# Patient Record
Sex: MALE | Race: WHITE | NOT HISPANIC OR LATINO | Employment: UNEMPLOYED | URBAN - METROPOLITAN AREA
[De-identification: names, ages, dates, MRNs, and addresses within clinical notes are randomized per-mention and may not be internally consistent; named-entity substitution may affect disease eponyms.]

---

## 2017-03-06 ENCOUNTER — HOSPITAL ENCOUNTER (OUTPATIENT)
Dept: LAB | Facility: MEDICAL CENTER | Age: 57
End: 2017-03-06
Attending: FAMILY MEDICINE
Payer: MEDICAID

## 2017-03-06 ENCOUNTER — OFFICE VISIT (OUTPATIENT)
Dept: MEDICAL GROUP | Facility: MEDICAL CENTER | Age: 57
End: 2017-03-06
Attending: FAMILY MEDICINE
Payer: MEDICAID

## 2017-03-06 ENCOUNTER — TELEPHONE (OUTPATIENT)
Dept: MEDICAL GROUP | Facility: MEDICAL CENTER | Age: 57
End: 2017-03-06

## 2017-03-06 ENCOUNTER — HOSPITAL ENCOUNTER (OUTPATIENT)
Dept: RADIOLOGY | Facility: MEDICAL CENTER | Age: 57
End: 2017-03-06
Attending: FAMILY MEDICINE
Payer: MEDICAID

## 2017-03-06 VITALS
SYSTOLIC BLOOD PRESSURE: 120 MMHG | HEART RATE: 95 BPM | OXYGEN SATURATION: 100 % | TEMPERATURE: 98.4 F | WEIGHT: 195 LBS | DIASTOLIC BLOOD PRESSURE: 82 MMHG | HEIGHT: 72 IN | BODY MASS INDEX: 26.41 KG/M2 | RESPIRATION RATE: 16 BRPM

## 2017-03-06 DIAGNOSIS — E20.0 IDIOPATHIC HYPOPARATHYROIDISM (HCC): ICD-10-CM

## 2017-03-06 DIAGNOSIS — F10.10 ETOH ABUSE: ICD-10-CM

## 2017-03-06 DIAGNOSIS — M79.641 HAND PAIN, RIGHT: ICD-10-CM

## 2017-03-06 LAB
ALBUMIN SERPL BCP-MCNC: 4.4 G/DL (ref 3.2–4.9)
ALBUMIN/GLOB SERPL: 1.6 G/DL
ALP SERPL-CCNC: 58 U/L (ref 30–99)
ALT SERPL-CCNC: 33 U/L (ref 2–50)
ANION GAP SERPL CALC-SCNC: 8 MMOL/L (ref 0–11.9)
AST SERPL-CCNC: 24 U/L (ref 12–45)
BILIRUB SERPL-MCNC: 0.8 MG/DL (ref 0.1–1.5)
BUN SERPL-MCNC: 27 MG/DL (ref 8–22)
CA-I SERPL-SCNC: 1 MMOL/L (ref 1.1–1.3)
CALCIUM SERPL-MCNC: 8 MG/DL (ref 8.5–10.5)
CHLORIDE SERPL-SCNC: 101 MMOL/L (ref 96–112)
CO2 SERPL-SCNC: 30 MMOL/L (ref 20–33)
CREAT SERPL-MCNC: 1.16 MG/DL (ref 0.5–1.4)
GLOBULIN SER CALC-MCNC: 2.7 G/DL (ref 1.9–3.5)
GLUCOSE SERPL-MCNC: 78 MG/DL (ref 65–99)
MAGNESIUM SERPL-MCNC: 1.9 MG/DL (ref 1.5–2.5)
PHOSPHATE SERPL-MCNC: 5.2 MG/DL (ref 2.5–4.5)
POTASSIUM SERPL-SCNC: 4 MMOL/L (ref 3.6–5.5)
PROT SERPL-MCNC: 7.1 G/DL (ref 6–8.2)
SODIUM SERPL-SCNC: 139 MMOL/L (ref 135–145)

## 2017-03-06 PROCEDURE — 73130 X-RAY EXAM OF HAND: CPT | Mod: RT

## 2017-03-06 PROCEDURE — 36415 COLL VENOUS BLD VENIPUNCTURE: CPT

## 2017-03-06 PROCEDURE — 80053 COMPREHEN METABOLIC PANEL: CPT

## 2017-03-06 PROCEDURE — 99212 OFFICE O/P EST SF 10 MIN: CPT | Performed by: FAMILY MEDICINE

## 2017-03-06 PROCEDURE — 84100 ASSAY OF PHOSPHORUS: CPT

## 2017-03-06 PROCEDURE — 83735 ASSAY OF MAGNESIUM: CPT

## 2017-03-06 PROCEDURE — 99214 OFFICE O/P EST MOD 30 MIN: CPT | Performed by: FAMILY MEDICINE

## 2017-03-06 PROCEDURE — 82330 ASSAY OF CALCIUM: CPT

## 2017-03-06 RX ORDER — MELOXICAM 15 MG/1
15 TABLET ORAL DAILY
Qty: 15 TAB | Refills: 0 | Status: SHIPPED | OUTPATIENT
Start: 2017-03-06 | End: 2017-11-29

## 2017-03-06 NOTE — MR AVS SNAPSHOT
"        Riki Tan   3/6/2017 8:50 AM   Office Visit   MRN: [id number]    Department:  [facility]   Dept Phone:  [phone]    Description:  Male : 1960   Provider:  Zev Calderón M.D.           Reason for Visit     Hand Injury possible broken knuckle on rt hand      Allergies as of 3/6/2017     Allergen Noted Reactions    Nkda [No Known Drug Allergy] 2009         You were diagnosed with     Hand pain, right   [433891]       Idiopathic hypoparathyroidism (CMS-HCC)   [966114]       ETOH abuse   [531996]         Vital Signs     Blood Pressure Pulse Temperature Respirations Height Weight    120/82 mmHg 95 36.9 °C (98.4 °F) 16 1.829 m (6' 0.01\") 88.451 kg (195 lb)    Body Mass Index Oxygen Saturation Smoking Status             26.44 kg/m2 100% Never Smoker          Basic Information     Date Of Birth Sex Race Ethnicity Preferred Language    1960 Male White Non- English      Your appointments     Mar 06, 2017  9:45 AM   XRAY 15 with [address] DX 1   [facility] - [address]    [address] [place] [id number]   [phone]            Mar 06, 2017 10:00 AM   Adult Draw/Collection with [facility]   [facility] (--)    [address]  [place] [address]   [phone]              Problem List              ICD-10-CM Priority Class Noted - Resolved    BPH (benign prostatic hyperplasia) N40.0   2009 - Present    ETOH abuse F10.10   2009 - Present    Hypocalcemia E83.51   2011 - Present    Hypoparathyroidism (CMS-HCC) E20.9   2011 - Present    Dysuria R30.0   2011 - Present    Vitamin D deficiency E55.9   2011 - Present    Interstitial cystitis N30.10   2011 - Present    Tobacco abuse Z72.0   2012 - Present    Hyperlipidemia E78.5   2013 - Present    Chronic renal failure, stage 3 (moderate) N18.3   2016 - Present    Hand pain, right M79.641   3/6/2017 - Present      Health Maintenance        Date " Due Completion Dates    IMM DTaP/Tdap/Td Vaccine (1 - Tdap) 3/20/1980 ---    IMM PNEUMOCOCCAL 19-64 (ADULT) MEDIUM RISK SERIES (1 of 1 - PPSV23) 3/20/1980 ---    COLONOSCOPY 7/7/2024 7/7/2014            Current Immunizations     Influenza TIV (IM) 10/18/2012, 11/15/2011    Influenza Vaccine Pediatric 1/5/2010    Influenza Vaccine Quad Inj (Pf) 12/20/2016, 11/5/2015    Influenza Vaccine Quad Inj (Preserved) 11/13/2014      Below and/or attached are the medications your provider expects you to take. Review all of your home medications and newly ordered medications with your provider and/or pharmacist. Follow medication instructions as directed by your provider and/or pharmacist. Please keep your medication list with you and share with your provider. Update the information when medications are discontinued, doses are changed, or new medications (including over-the-counter products) are added; and carry medication information at all times in the event of emergency situations     Allergies:  NKDA - (reactions not documented)               Medications  Valid as of: March 06, 2017 -  9:40 AM    Generic Name Brand Name Tablet Size Instructions for use    Calcitriol (Cap) ROCALTROL 0.25 MCG TAKE 1 CAPSULE ORALLY 3 TIMES DAILY PER DR. REID        Calcium Carbonate-Vitamin D (Tab) OSCAL + D 500-200 MG-UNIT Take 2 Tabs by mouth every morning with breakfast.        Ergocalciferol (Cap) DRISDOL 66245 UNITS Take 1 Cap by mouth every 7 days.        Hydrocodone-Acetaminophen (Tab) NORCO  MG Take 1 Tab by mouth every 8 hours as needed.        Meloxicam (Tab) MOBIC 15 MG Take 1 Tab by mouth every day.        Pentosan Polysulfate Sodium (Cap) ELMIRON 100 MG Take 2 Caps by mouth 2 times a day.        .                 Medicines prescribed today were sent to:     [id number] [place] - [address]    [address] [place] [address]    Phone: [phone] Fax: [phone]    Open 24 Hours?: No    [facility] - [place], [place] - [address]    [address] [place] [address]    Phone: [phone] Fax: [phone]    Open 24 Hours?: No      Medication refill instructions:       If your prescription bottle indicates you have medication refills left, it is not necessary to call your provider’s office. Please contact your pharmacy and they will refill your medication.    If your prescription bottle indicates you do not have any refills left, you may request refills at any time through one of the following ways: The online [employer] system (except Urgent Care), by calling your provider’s office, or by asking your pharmacy to contact your provider’s office with a refill request. Medication refills are processed only during regular business hours and may not be available until the next business day. Your provider may request additional information or to have a follow-up visit with you prior to refilling your medication.   *Please Note: Medication refills are assigned a new Rx number when refilled electronically. Your pharmacy may indicate that no refills were authorized even though a new prescription for the same medication is available at the pharmacy. Please request the medicine by name with the pharmacy before contacting your provider for a refill.        Your To Do List     Future Labs/Procedures Complete By Expires    DX-HAND 3+ RIGHT  As directed 9/6/2017      Other Notes About Your Plan     Ionized calcium every 3-4 months           [facility] Status: Patient Declined

## 2017-03-06 NOTE — PROGRESS NOTES
Chief Complaint   Patient presents with   • Hand Injury     possible broken knuckle on rt hand       HISTORY OF PRESENT ILLNESS: Patient is a 56 y.o. male established patient who presents today to follow-up on persistent pain in his right hand ×1 year, recurrent EtOH abuse, and hypoparathyroidism        Hand pain, right  Patient ports onset one year ago of pain between the MCP and PIP joint of his right long finger after a week of fairly intense use of a Sawzall. Patient reports from time to time if he either clenches his right fist tightly or has direct contact near the long finger MCP but he will have a sudden sharp pain. There's been no persistent discoloration slight thickening over the MCP has been noted. Denies diffuse hand weakness or numbness.    ETOH abuse  Patient bg is still drinking at least 1 quart of beer per day and several times per week will have double or triple that amount. He denies any tremors, history of jaundice, seizures, or abdominal pain.    Hypoparathyroidism  Patient agrees to get his labs ordered in December drawn today as soon as he leaves our office over at [facility]. He is not reporting any polyuria, hematuria. He does note intermittent dysuria associated with this history of interstitial cystitis and is taking Elmiron for that condition. Denies any unexplained muscle cramps or diarrhea.      Patient Active Problem List    Diagnosis Date Noted   • Hand pain, right 03/06/2017   • Chronic renal failure, stage 3 (moderate) 12/20/2016   • Hyperlipidemia 01/09/2013   • Tobacco abuse 05/08/2012   • Interstitial cystitis 08/31/2011   • Hypocalcemia 02/25/2011   • Hypoparathyroidism (CMS-[facility]) 02/25/2011   • Dysuria 02/25/2011   • Vitamin D deficiency 02/25/2011   • BPH (benign prostatic hyperplasia) 06/11/2009   • ETOH abuse 06/11/2009     Social history-single, not working  Allergies:Nkda    Current Outpatient Prescriptions   Medication Sig Dispense Refill   • meloxicam (MOBIC) 15 MG tablet  "Take 1 Tab by mouth every day. 15 Tab 0   • vitamin D, Ergocalciferol, (DRISDOL) 11798 UNITS Cap capsule Take 1 Cap by mouth every 7 days. 4 Cap 11   • calcitRIOL (ROCALTROL) 0.25 MCG Cap TAKE 1 CAPSULE ORALLY 3 TIMES DAILY PER DR. REID 90 Cap 6   • hydrocodone/acetaminophen (NORCO)  MG Tab Take 1 Tab by mouth every 8 hours as needed. 25 Tab 0   • pentosan (ELMIRON) 100 MG Cap Take 2 Caps by mouth 2 times a day. 120 Cap 11   • calcium carbonate-vitamin D (CALCIUM 500/D) 500-200 MG-UNIT TABS Take 2 Tabs by mouth every morning with breakfast.       No current facility-administered medications for this visit.       Social History   Substance Use Topics   • Smoking status: Never Smoker    • Smokeless tobacco: Current User     Types: Chew      Comment: 1 can/week   • Alcohol Use: 15.0 oz/week     30 Cans of beer per week       Family History   Problem Relation Age of Onset   • Cancer Mother    • Lung Disease Neg Hx    • Diabetes Neg Hx    • Heart Disease Neg Hx    • Stroke Neg Hx        ROS:  Review of Systems   Constitutional: Negative for fever, chills, weight loss and malaise/fatigue.   Eyes: Negative for blurred vision.   Respiratory: Negative for cough, sputum production, shortness of breath and wheezing.    Cardiovascular: Negative for chest pain, palpitations, orthopnea and leg swelling.   Gastrointestinal: Negative for heartburn, nausea, vomiting and abdominal pain.   Musculoskeletal: Negative for myalgias, back pain   Endo/Heme/Allergies: Does not bruise/bleed easily.               Exam:  Blood pressure 120/82, pulse 95, temperature 36.9 °C (98.4 °F), resp. rate 16, height 1.829 m (6' 0.01\"), weight 88.451 kg (195 lb), SpO2 100 %.  General:  Well nourished, well developed male in NAD  Head is grossly normal.  Neck: Supple without JVD or bruit. Thyroid is not enlarged. Trachea is midline.  Pulmonary: Clear to ausculation .  Normal effort. No rales, ronchi, or wheezing.  Cardiovascular: Regular rate and " rhythm without murmur.  Abdomen-Abdomen is soft, normal bowel sounds, no masses, guarding, ororganomegaly, or tenderness.  Right hand-slight thickening noted over the dorsum of the long finger MCP. Slightly sensitive to compression over the midportion of the proximal phalanx with no step-off or visible deformity. There is no rotational deformity upon flexion of the fingers on the right hand. Light touch is preserved.      Please note that this dictation was created using voice recognition software. I have made every reasonable attempt to correct obvious errors, but I expect that there are errors of grammar and possibly content that I did not discover before finalizing the note.    Assessment/Plan:  1. Hand pain, right  DX-HAND 3+ RIGHT   2. Idiopathic hypoparathyroidism (CMS-HCC)     3. ETOH abuse       Plan: 1. Plain x-ray of the right hand  2. Patient agrees to wear a splint isolating the right long finger during the days ×2 weeks  3. Meloxicam 15 mg by mouth daily with food ×14 days  4. Patient will collect hypoparathyroidism labs including 24-hour urine for calcium excretion  5. Revisit within 3 months

## 2017-03-06 NOTE — ASSESSMENT & PLAN NOTE
Patient ports onset one year ago of pain between the MCP and PIP joint of his right long finger after a week of fairly intense use of a Sawzall. Patient reports from time to time if he either clenches his right fist tightly or has direct contact near the long finger MCP but he will have a sudden sharp pain. There's been no persistent discoloration slight thickening over the MCP has been noted. Denies diffuse hand weakness or numbness.

## 2017-03-06 NOTE — ASSESSMENT & PLAN NOTE
Patient agrees to get his labs ordered in December drawn today as soon as he leaves our office over at [facility]. He is not reporting any polyuria, hematuria. He does note intermittent dysuria associated with this history of interstitial cystitis and is taking Elmiron for that condition. Denies any unexplained muscle cramps or diarrhea.

## 2017-03-07 ENCOUNTER — TELEPHONE (OUTPATIENT)
Dept: MEDICAL GROUP | Facility: MEDICAL CENTER | Age: 57
End: 2017-03-07

## 2017-03-07 DIAGNOSIS — E20.0 IDIOPATHIC HYPOPARATHYROIDISM (HCC): ICD-10-CM

## 2017-03-07 NOTE — TELEPHONE ENCOUNTER
----- Message from Zev Calderón M.D. sent at 3/6/2017  5:31 PM PST -----  Hand x-ray shows intact bony architecture. Proceed with splinting and anti-inflammatory pill.

## 2017-03-07 NOTE — TELEPHONE ENCOUNTER
Phone Number Called: [phone] (home)       Message:Pt informed of the following results: Hand x-ray shows intact bony architecture. Proceed with splinting and anti-inflammatory pill.     Left Message for patient to call back: N\A

## 2017-03-08 NOTE — TELEPHONE ENCOUNTER
Phone Number Called: [phone] (home)       Message:Pt informed: Current ionized calcium is fairly good, at 1.0, normal Mg++, mildly elev phosphorus, part of his condition. I want to retest labs in 3 mos, orders done.    Left Message for patient to call back: N\A

## 2017-03-08 NOTE — TELEPHONE ENCOUNTER
----- Message from Zev Calderón M.D. sent at 3/7/2017  8:47 AM PST -----  Current ionized calcium is fairly good, at 1.0, normal Mg++, mildly elev phosphorus, part of his condition. I want to retest labs in 3 mos, orders done.

## 2017-03-10 ENCOUNTER — HOSPITAL ENCOUNTER (OUTPATIENT)
Facility: MEDICAL CENTER | Age: 57
End: 2017-03-10
Attending: FAMILY MEDICINE
Payer: MEDICAID

## 2017-03-10 ENCOUNTER — TELEPHONE (OUTPATIENT)
Dept: MEDICAL GROUP | Facility: MEDICAL CENTER | Age: 57
End: 2017-03-10

## 2017-03-10 DIAGNOSIS — E20.0 IDIOPATHIC HYPOPARATHYROIDISM (HCC): ICD-10-CM

## 2017-03-10 PROCEDURE — 82340 ASSAY OF CALCIUM IN URINE: CPT

## 2017-03-12 LAB
CALCIUM 24H UR-MCNC: 11.3 MG/DL
CALCIUM 24H UR-MRATE: 212 MG/D
CALCIUM/CREAT 24H UR: 145 MG/G (ref 20–240)
CREAT 24H UR-MCNC: 78 MG/DL
CREAT 24H UR-MRATE: 1462 MG/D (ref 800–2100)
SPECIMEN VOL ?TM UR: 1875 ML

## 2017-03-13 ENCOUNTER — TELEPHONE (OUTPATIENT)
Dept: MEDICAL GROUP | Facility: MEDICAL CENTER | Age: 57
End: 2017-03-13

## 2017-03-14 NOTE — TELEPHONE ENCOUNTER
Phone Number Called: [phone] (home)       Message: Pt informed of the following results: Urinary calcium content and excretion appears normal     Left Message for patient to call back: N\A

## 2017-06-13 ENCOUNTER — OFFICE VISIT (OUTPATIENT)
Dept: MEDICAL GROUP | Facility: MEDICAL CENTER | Age: 57
End: 2017-06-13
Attending: FAMILY MEDICINE
Payer: MEDICAID

## 2017-06-13 VITALS
DIASTOLIC BLOOD PRESSURE: 64 MMHG | HEART RATE: 100 BPM | TEMPERATURE: 97.9 F | OXYGEN SATURATION: 94 % | BODY MASS INDEX: 25.73 KG/M2 | RESPIRATION RATE: 16 BRPM | WEIGHT: 190 LBS | HEIGHT: 72 IN | SYSTOLIC BLOOD PRESSURE: 110 MMHG

## 2017-06-13 DIAGNOSIS — M79.642 LEFT HAND PAIN: ICD-10-CM

## 2017-06-13 DIAGNOSIS — E20.9 HYPOPARATHYROIDISM, UNSPECIFIED HYPOPARATHYROIDISM TYPE (HCC): ICD-10-CM

## 2017-06-13 DIAGNOSIS — N30.10 IC (INTERSTITIAL CYSTITIS): ICD-10-CM

## 2017-06-13 PROCEDURE — 99213 OFFICE O/P EST LOW 20 MIN: CPT | Performed by: FAMILY MEDICINE

## 2017-06-13 RX ORDER — IBUPROFEN 800 MG/1
800 TABLET ORAL
Qty: 60 TAB | Refills: 0 | Status: SHIPPED | OUTPATIENT
Start: 2017-06-13 | End: 2017-11-29

## 2017-06-13 RX ORDER — SILDENAFIL 100 MG/1
100 TABLET, FILM COATED ORAL PRN
Qty: 10 TAB | Refills: 2 | Status: SHIPPED | OUTPATIENT
Start: 2017-06-13 | End: 2017-11-29

## 2017-06-13 ASSESSMENT — PAIN SCALES - GENERAL: PAINLEVEL: NO PAIN

## 2017-06-13 ASSESSMENT — PATIENT HEALTH QUESTIONNAIRE - PHQ9: CLINICAL INTERPRETATION OF PHQ2 SCORE: 0

## 2017-06-13 NOTE — MR AVS SNAPSHOT
"        Riki Tan   2017 9:30 AM   Office Visit   MRN: [id number]    Department:  [facility]care [facility]   Dept Phone:  [phone]    Description:  Male : 1960   Provider:  Zev Calderón M.D.           Reason for Visit     Follow-Up           Allergies as of 2017     Allergen Noted Reactions    Nkda [No Known Drug Allergy] 2009         You were diagnosed with     IC (interstitial cystitis)   [075024]       Left hand pain   [671542]       Hypoparathyroidism, unspecified hypoparathyroidism type (CMS-HCC)   [7060510]         Vital Signs     Blood Pressure Pulse Temperature Respirations Height Weight    110/64 mmHg 100 36.6 °C (97.9 °F) 16 1.829 m (6' 0.01\") 86.183 kg (190 lb)    Body Mass Index Oxygen Saturation Smoking Status             25.76 kg/m2 94% Never Smoker          Basic Information     Date Of Birth Sex Race Ethnicity Preferred Language    1960 Male White Non- English      Your appointments     2017  9:30 AM   Established Patient with Zev Calderón M.D.   [facility] ([facility])    [address] [place] [id number]   [phone]           You will be receiving a confirmation call a few days before your appointment from our automated call confirmation system.              Problem List              ICD-10-CM Priority Class Noted - Resolved    BPH (benign prostatic hyperplasia) N40.0   2009 - Present    ETOH abuse F10.10   2009 - Present    Hypocalcemia E83.51   2011 - Present    Hypoparathyroidism (CMS-HCC) E20.9   2011 - Present    Dysuria R30.0   2011 - Present    Vitamin D deficiency E55.9   2011 - Present    Interstitial cystitis N30.10   2011 - Present    Tobacco abuse Z72.0   2012 - Present    Hyperlipidemia E78.5   2013 - Present    Chronic renal failure, stage 3 (moderate) N18.3   2016 - Present    Hand pain, right M79.641   3/6/2017 - Present      Health Maintenance       " Date Due Completion Dates    IMM DTaP/Tdap/Td Vaccine (1 - Tdap) 3/20/1980 ---    IMM PNEUMOCOCCAL 19-64 (ADULT) MEDIUM RISK SERIES (1 of 1 - PPSV23) 3/20/1980 ---    COLONOSCOPY 7/7/2024 7/7/2014            Current Immunizations     Influenza TIV (IM) 10/18/2012, 11/15/2011    Influenza Vaccine Pediatric 1/5/2010    Influenza Vaccine Quad Inj (Pf) 12/20/2016, 11/5/2015    Influenza Vaccine Quad Inj (Preserved) 11/13/2014      Below and/or attached are the medications your provider expects you to take. Review all of your home medications and newly ordered medications with your provider and/or pharmacist. Follow medication instructions as directed by your provider and/or pharmacist. Please keep your medication list with you and share with your provider. Update the information when medications are discontinued, doses are changed, or new medications (including over-the-counter products) are added; and carry medication information at all times in the event of emergency situations     Allergies:  NKDA - (reactions not documented)               Medications  Valid as of: June 13, 2017 - 10:05 AM    Generic Name Brand Name Tablet Size Instructions for use    Calcitriol (Cap) ROCALTROL 0.25 MCG TAKE 1 CAPSULE ORALLY 3 TIMES DAILY PER DR. REID        Calcium Carbonate-Vitamin D (Tab) OSCAL + D 500-200 MG-UNIT Take 2 Tabs by mouth every morning with breakfast.        Ergocalciferol (Cap) DRISDOL 97733 UNITS Take 1 Cap by mouth every 7 days.        Hydrocodone-Acetaminophen (Tab) NORCO  MG Take 1 Tab by mouth every 8 hours as needed.        Ibuprofen (Tab) MOTRIN 800 MG Take 1 Tab by mouth 2 times daily with meals as needed.        Meloxicam (Tab) MOBIC 15 MG Take 1 Tab by mouth every day.        Pentosan Polysulfate Sodium (Cap) ELMIRON 100 MG Take 2 Caps by mouth 2 times a day.        Sildenafil Citrate (Tab) VIAGRA 100 MG Take 1 Tab by mouth as needed for Erectile Dysfunction.        .                 Medicines  prescribed today were sent to:     [id number] - [place], [place] - [address]    [address] [place] [address]    Phone: [phone] Fax: [phone]    Open 24 Hours?: No    [facility] - [place], [place] - [address]    [address] [place] [address]    Phone: [phone] Fax: [phone]    Open 24 Hours?: No      Medication refill instructions:       If your prescription bottle indicates you have medication refills left, it is not necessary to call your provider’s office. Please contact your pharmacy and they will refill your medication.    If your prescription bottle indicates you do not have any refills left, you may request refills at any time through one of the following ways: The online [email or web address] system (except Urgent Care), by calling your provider’s office, or by asking your pharmacy to contact your provider’s office with a refill request. Medication refills are processed only during regular business hours and may not be available until the next business day. Your provider may request additional information or to have a follow-up visit with you prior to refilling your medication.   *Please Note: Medication refills are assigned a new Rx number when refilled electronically. Your pharmacy may indicate that no refills were authorized even though a new prescription for the same medication is available at the pharmacy. Please request the medicine by name with the pharmacy before contacting your provider for a refill.        Your To Do List     Future Labs/Procedures Complete By Expires    CALCIUM (CA)  As directed 6/13/2018    DX-HAND 2- LEFT  As directed 6/13/2018    PHOSPHORUS  As directed 6/13/2018    PTH INTACT (PTH ONLY)  As directed 6/13/2018      Other Notes About Your Plan     Ionized calcium every 3-4 months           MyChart Status: Patient Declined        Quit Tobacco Information     Do you want to quit using tobacco?    Quitting tobacco decreases risks of cancer, heart and lung  disease, increases life expectancy, improves sense of taste and smell, and increases spending money, among other benefits.    If you are thinking about quitting, we can help.  • [facility] Quit Tobacco Program: [phone]  o Program occurs weekly for four weeks and includes pharmacist consultation on products to support quitting smoking or chewing tobacco. A provider referral is needed for pharmacist consultation.  • Tobacco Users Help Hotline: 9-989-QUIT-NOW (237-4980) or https://[place].quitlogix.org/  o Free, confidential telephone and online coaching for [place] residents. Sessions are designed on a schedule that is convenient for you. Eligible clients receive free nicotine replacement therapy.  • Nationally: www.smokefree.gov  o Information and professional assistance to support both immediate and long-term needs as you become, and remain, a non-smoker. Smokefree.gov allows you to choose the help that best fits your needs.

## 2017-06-13 NOTE — PROGRESS NOTES
"Subjective:      Riki Tan is a 57 y.o. male who presents with Follow-Up            HPI 1. Left hand pain-patient notes pain near the base of the left long finger, nondominant, over the past 1-2 months. He notes that there is kind of a trigger finger action as she is trying to bring his flexed long finger back up into extension near the proximal phalanx. He gets benefit if he tapes his long finger to his ring finger which he has been doing every 2 days recently. Notes particular pain if he flexes his long finger down towards his palm. Does not recall any specific incident of trauma. Previous pain over on the right hand has mostly resolved.    ROS negative for current neck pain, upper extremity numbness, focal swelling or skin color changes.       Objective:     /64 mmHg  Pulse 100  Temp(Src) 36.6 °C (97.9 °F)  Resp 16  Ht 1.829 m (6' 0.01\")  Wt 86.183 kg (190 lb)  BMI 25.76 kg/m2  SpO2 94%     Physical Exam   Gen.-alert cooperative male in no acute distress  Left hand-mildly tender over the proximal half of the proximal phalanx left long finger dorsal side. Patient has slightly decreased flexion at the long finger MCP joint on the left side.  Left upper extremity-intact light touch, intact strength beyond the left long finger          Assessment/Plan:     1. IC (interstitial cystitis)    - pentosan (ELMIRON) 100 MG Cap; Take 2 Caps by mouth 2 times a day.  Dispense: 120 Cap; Refill: 11  - DX-HAND 2- LEFT; Future    2. Left hand pain    Plan: 1. May continue to splint to the ring finger using Coban strapping  2. 7 to ten-day trial of ibuprofen 800 mg twice daily with food-GI precautions reviewed  3. Plain x-rays left hand  4. Consider orthopedic referral for possible trigger finger      "

## 2017-08-08 ENCOUNTER — OFFICE VISIT (OUTPATIENT)
Dept: MEDICAL GROUP | Facility: MEDICAL CENTER | Age: 57
End: 2017-08-08
Attending: FAMILY MEDICINE
Payer: MEDICAID

## 2017-08-08 VITALS
BODY MASS INDEX: 25.6 KG/M2 | DIASTOLIC BLOOD PRESSURE: 74 MMHG | HEIGHT: 72 IN | WEIGHT: 189 LBS | HEART RATE: 96 BPM | SYSTOLIC BLOOD PRESSURE: 128 MMHG | RESPIRATION RATE: 16 BRPM | TEMPERATURE: 97.5 F | OXYGEN SATURATION: 93 %

## 2017-08-08 DIAGNOSIS — S76.211A INGUINAL STRAIN, RIGHT, INITIAL ENCOUNTER: ICD-10-CM

## 2017-08-08 PROCEDURE — 99213 OFFICE O/P EST LOW 20 MIN: CPT | Performed by: FAMILY MEDICINE

## 2017-08-08 PROCEDURE — 99212 OFFICE O/P EST SF 10 MIN: CPT | Performed by: FAMILY MEDICINE

## 2017-08-08 NOTE — PROGRESS NOTES
"Subjective:      Riki Tan is a 57 y.o. male who presents with Follow-Up and Hernia            HPI 1. Right inguinal strain-patient reports about 3 days ago he was with one other individual cutting out a heavy wall. He ended up doing unanticipated muscular lifting to catch the wall and noticed a uncomfortable sensation \"puffing out\" in his right groin. It has remained significantly tender over the next 2 days. Notes some mild improvement just this morning in tenderness in the groin. At times feels like it is bulging and at times feels like his testicle is retracting which it is not doing and actually does remain in his scrotum.    ROS negative for hematuria, dysuria, constipation, fecal incontinence, nausea, emesis       Objective:     /74 mmHg  Pulse 96  Temp(Src) 36.4 °C (97.5 °F)  Resp 16  Ht 1.829 m (6' 0.01\")  Wt 85.73 kg (189 lb)  BMI 25.63 kg/m2  SpO2 93%     Physical Exam  Gen.-alert cooperative male in no acute distress  Abdomen- normal bowel sounds, soft without guarding. Liver and spleen not enlarged, no palpable masses or tenderness.  -pelvis is unremarkable. No palpable impulse with Valsalva is noted in the right inguinal area or the external ring of the right inguinal canal. Both testicles are descended. No overlying redness or warmth          Assessment/Plan:     1. Inguinal strain, right, initial encounter        Plan: 1. Patient is advised to avoid any heavy lifting or straining for the next 2-6 weeks  2. Anticipate resolution of presumed strain in that timeframe-patient will call if he feels that he is not improving at all over the next 2 weeks or if he starts noticing a recurrent bulge in his right groin consistent with a inguinal hernia  3. May use meloxicam one daily with food or ibuprofen also with food as needed for any significant episodes of inguinal pain.  "

## 2017-08-08 NOTE — MR AVS SNAPSHOT
"Riki Tan   2017 9:10 AM   Office Visit   MRN: [id number]    [facility]   Dept Phone:  [phone]    Description:  Male : 1960   Provider:  Zev Calderón M.D.           Reason for Visit     Follow-Up     Hernia           Allergies as of 2017     Allergen Noted Reactions    Nkda [No Known Drug Allergy] 2009         You were diagnosed with     Inguinal strain, right, initial encounter   [[id number]]         Vital Signs     Blood Pressure Pulse Temperature Respirations Height Weight    128/74 mmHg 96 36.4 °C (97.5 °F) 16 1.829 m (6' 0.01\") 85.73 kg (189 lb)    Body Mass Index Oxygen Saturation Smoking Status             25.63 kg/m2 93% Never Smoker          Basic Information     Date Of Birth Sex Race Ethnicity Preferred Language    1960 Male White Non- English      Problem List              ICD-10-CM Priority Class Noted - Resolved    BPH (benign prostatic hyperplasia) N40.0   2009 - Present    ETOH abuse F10.10   2009 - Present    Hypocalcemia E83.51   2011 - Present    Hypoparathyroidism (CMS-HCC) E20.9   2011 - Present    Dysuria R30.0   2011 - Present    Vitamin D deficiency E55.9   2011 - Present    Interstitial cystitis N30.10   2011 - Present    Tobacco abuse Z72.0   2012 - Present    Hyperlipidemia E78.5   2013 - Present    Chronic renal failure, stage 3 (moderate) N18.3   2016 - Present    Hand pain, right M79.641   3/6/2017 - Present      Health Maintenance        Date Due Completion Dates    IMM DTaP/Tdap/Td Vaccine (1 - Tdap) 3/20/1980 ---    IMM PNEUMOCOCCAL 19-64 (ADULT) MEDIUM RISK SERIES (1 of 1 - PPSV23) 3/20/1980 ---    IMM INFLUENZA (1) 2016, 2015, 2014, 10/18/2012, 11/15/2011, 2010    COLONOSCOPY 2024            Current Immunizations     Influenza TIV (IM) 10/18/2012, 11/15/2011    Influenza Vaccine Pediatric 2010    Influenza " Vaccine Quad Inj (Pf) 12/20/2016, 11/5/2015    Influenza Vaccine Quad Inj (Preserved) 11/13/2014      Below and/or attached are the medications your provider expects you to take. Review all of your home medications and newly ordered medications with your provider and/or pharmacist. Follow medication instructions as directed by your provider and/or pharmacist. Please keep your medication list with you and share with your provider. Update the information when medications are discontinued, doses are changed, or new medications (including over-the-counter products) are added; and carry medication information at all times in the event of emergency situations     Allergies:  NKDA - (reactions not documented)               Medications  Valid as of: August 08, 2017 - 10:16 AM    Generic Name Brand Name Tablet Size Instructions for use    Calcitriol (Cap) ROCALTROL 0.25 MCG TAKE 1 CAPSULE ORALLY 3 TIMES DAILY PER DR. REID        Calcium Carbonate-Vitamin D (Tab) OSCAL + D 500-200 MG-UNIT Take 2 Tabs by mouth every morning with breakfast.        Ergocalciferol (Cap) DRISDOL 98514 UNITS Take 1 Cap by mouth every 7 days.        Hydrocodone-Acetaminophen (Tab) NORCO  MG Take 1 Tab by mouth every 8 hours as needed.        Ibuprofen (Tab) MOTRIN 800 MG Take 1 Tab by mouth 2 times daily with meals as needed.        Meloxicam (Tab) MOBIC 15 MG Take 1 Tab by mouth every day.        Pentosan Polysulfate Sodium (Cap) ELMIRON 100 MG Take 2 Caps by mouth 2 times a day.        Sildenafil Citrate (Tab) VIAGRA 100 MG Take 1 Tab by mouth as needed for Erectile Dysfunction.        .                 Medicines prescribed today were sent to:     [id number] [place] - [address]    [address] [facility] [address]    Phone: [phone] Fax: [phone]    Open 24 Hours?: No    [facility] [place] - [address] [facility].    [address] [place] [address]    Phone: [phone] Fax: [phone]    Open 24 Hours?: No        Medication refill instructions:       If your prescription bottle indicates you have medication refills left, it is not necessary to call your provider’s office. Please contact your pharmacy and they will refill your medication.    If your prescription bottle indicates you do not have any refills left, you may request refills at any time through one of the following ways: The online [employer] system (except Urgent Care), by calling your provider’s office, or by asking your pharmacy to contact your provider’s office with a refill request. Medication refills are processed only during regular business hours and may not be available until the next business day. Your provider may request additional information or to have a follow-up visit with you prior to refilling your medication.   *Please Note: Medication refills are assigned a new Rx number when refilled electronically. Your pharmacy may indicate that no refills were authorized even though a new prescription for the same medication is available at the pharmacy. Please request the medicine by name with the pharmacy before contacting your provider for a refill.        Other Notes About Your Plan     Ionized calcium every 3-4 months           MyChart Status: Patient Declined        Quit Tobacco Information     Do you want to quit using tobacco?    Quitting tobacco decreases risks of cancer, heart and lung disease, increases life expectancy, improves sense of taste and smell, and increases spending money, among other benefits.    If you are thinking about quitting, we can help.  • [facility] Quit Tobacco Program: 955.304.7127  o Program occurs weekly for four weeks and includes pharmacist consultation on products to support quitting smoking or chewing tobacco. A provider referral is needed for pharmacist consultation.  • Tobacco Users Help Hotline: 7-450-QUIT-NOW (984-2667) or https://[place].quitlogix.org/  o Free, confidential telephone and online coaching for [place]  residents. Sessions are designed on a schedule that is convenient for you. Eligible clients receive free nicotine replacement therapy.  • Nationally: www.smokefree.gov  o Information and professional assistance to support both immediate and long-term needs as you become, and remain, a non-smoker. Smokefree.gov allows you to choose the help that best fits your needs.

## 2017-09-11 ENCOUNTER — TELEPHONE (OUTPATIENT)
Dept: MEDICAL GROUP | Facility: MEDICAL CENTER | Age: 57
End: 2017-09-11

## 2017-09-11 NOTE — TELEPHONE ENCOUNTER
1. Caller Name: TONY                                         Call Back Number: [phone]      Patient approves a detailed voicemail message: yes and no    Patient called and wanted to let you know that he is having issues with his hernia, and was told to inform you if he continued to have issues he could possibly be referred to a different surgeon. Please advise.

## 2017-09-19 ENCOUNTER — TELEPHONE (OUTPATIENT)
Dept: MEDICAL GROUP | Facility: MEDICAL CENTER | Age: 57
End: 2017-09-19

## 2017-09-19 DIAGNOSIS — K40.90 RIGHT INGUINAL HERNIA: ICD-10-CM

## 2017-09-20 NOTE — TELEPHONE ENCOUNTER
Please inform patient that referral has been placed.  Covering for Dr. Calderón who is out of office.  Patient saw PCP on 8/8/17 for inguinal strain, called to let us know symptoms are not improving.  Will refer for surgery consult/hernia repair.  Shilpi Lazcano M.D.

## 2017-09-20 NOTE — TELEPHONE ENCOUNTER
1. Caller Name: Riki                                           Call Back Number: [phone] (home)         Patient approves a detailed voicemail message: yes    2. SPECIFIC Action To Be Taken: Referral to General Surgery for hernia    3. Diagnosis/Clinical Reason for Request: Hernia    4. Specialty & Provider Name/Lab/Imaging Location: General Surgery    5. Is appointment scheduled for requested order/referral: no    Patient informed they will receive a return phone call from the office ONLY if there are any questions before processing their request. Advised to call back if they haven't received a call from the referral department in 5 days.

## 2017-11-02 ENCOUNTER — HOSPITAL ENCOUNTER (OUTPATIENT)
Dept: LAB | Facility: MEDICAL CENTER | Age: 57
End: 2017-11-02
Attending: FAMILY MEDICINE
Payer: MEDICAID

## 2017-11-02 DIAGNOSIS — E20.0 IDIOPATHIC HYPOPARATHYROIDISM (HCC): ICD-10-CM

## 2017-11-02 DIAGNOSIS — E20.9 HYPOPARATHYROIDISM, UNSPECIFIED HYPOPARATHYROIDISM TYPE (HCC): ICD-10-CM

## 2017-11-02 LAB
ALBUMIN SERPL BCP-MCNC: 4.4 G/DL (ref 3.2–4.9)
ALBUMIN/GLOB SERPL: 1.8 G/DL
ALP SERPL-CCNC: 55 U/L (ref 30–99)
ALT SERPL-CCNC: 22 U/L (ref 2–50)
ANION GAP SERPL CALC-SCNC: 10 MMOL/L (ref 0–11.9)
AST SERPL-CCNC: 20 U/L (ref 12–45)
BILIRUB SERPL-MCNC: 1 MG/DL (ref 0.1–1.5)
BUN SERPL-MCNC: 22 MG/DL (ref 8–22)
CA-I SERPL-SCNC: 1 MMOL/L (ref 1.1–1.3)
CALCIUM SERPL-MCNC: 8.3 MG/DL (ref 8.5–10.5)
CHLORIDE SERPL-SCNC: 100 MMOL/L (ref 96–112)
CO2 SERPL-SCNC: 30 MMOL/L (ref 20–33)
CREAT SERPL-MCNC: 1.23 MG/DL (ref 0.5–1.4)
GFR SERPL CREATININE-BSD FRML MDRD: >60 ML/MIN/1.73 M 2
GLOBULIN SER CALC-MCNC: 2.5 G/DL (ref 1.9–3.5)
GLUCOSE SERPL-MCNC: 60 MG/DL (ref 65–99)
MAGNESIUM SERPL-MCNC: 2.1 MG/DL (ref 1.5–2.5)
PHOSPHATE SERPL-MCNC: 4.9 MG/DL (ref 2.5–4.5)
POTASSIUM SERPL-SCNC: 4.3 MMOL/L (ref 3.6–5.5)
PROT SERPL-MCNC: 6.9 G/DL (ref 6–8.2)
PTH-INTACT SERPL-MCNC: 2.7 PG/ML (ref 14–72)
SODIUM SERPL-SCNC: 140 MMOL/L (ref 135–145)

## 2017-11-02 PROCEDURE — 83735 ASSAY OF MAGNESIUM: CPT

## 2017-11-02 PROCEDURE — 80053 COMPREHEN METABOLIC PANEL: CPT

## 2017-11-02 PROCEDURE — 84100 ASSAY OF PHOSPHORUS: CPT

## 2017-11-02 PROCEDURE — 36415 COLL VENOUS BLD VENIPUNCTURE: CPT

## 2017-11-02 PROCEDURE — 82652 VIT D 1 25-DIHYDROXY: CPT

## 2017-11-02 PROCEDURE — 83970 ASSAY OF PARATHORMONE: CPT

## 2017-11-02 PROCEDURE — 82330 ASSAY OF CALCIUM: CPT

## 2017-11-04 LAB — 1,25(OH)2D3 SERPL-MCNC: 26.1 PG/ML (ref 19.9–79.3)

## 2017-11-06 ENCOUNTER — TELEPHONE (OUTPATIENT)
Dept: MEDICAL GROUP | Facility: MEDICAL CENTER | Age: 57
End: 2017-11-06

## 2017-11-06 ENCOUNTER — OFFICE VISIT (OUTPATIENT)
Dept: MEDICAL GROUP | Facility: MEDICAL CENTER | Age: 57
End: 2017-11-06
Attending: FAMILY MEDICINE
Payer: MEDICAID

## 2017-11-06 VITALS
SYSTOLIC BLOOD PRESSURE: 112 MMHG | DIASTOLIC BLOOD PRESSURE: 76 MMHG | TEMPERATURE: 97.4 F | WEIGHT: 191 LBS | BODY MASS INDEX: 25.87 KG/M2 | OXYGEN SATURATION: 95 % | RESPIRATION RATE: 16 BRPM | HEART RATE: 100 BPM | HEIGHT: 72 IN

## 2017-11-06 DIAGNOSIS — Z23 NEED FOR PROPHYLACTIC VACCINATION WITH STREPTOCOCCUS PNEUMONIAE (PNEUMOCOCCUS) AND INFLUENZA VACCINES: ICD-10-CM

## 2017-11-06 DIAGNOSIS — E20.0 IDIOPATHIC HYPOPARATHYROIDISM (HCC): ICD-10-CM

## 2017-11-06 PROCEDURE — 90686 IIV4 VACC NO PRSV 0.5 ML IM: CPT | Performed by: FAMILY MEDICINE

## 2017-11-06 PROCEDURE — 90732 PPSV23 VACC 2 YRS+ SUBQ/IM: CPT | Performed by: FAMILY MEDICINE

## 2017-11-06 PROCEDURE — 90471 IMMUNIZATION ADMIN: CPT | Performed by: FAMILY MEDICINE

## 2017-11-06 PROCEDURE — 99213 OFFICE O/P EST LOW 20 MIN: CPT | Mod: 25 | Performed by: FAMILY MEDICINE

## 2017-11-06 PROCEDURE — 99212 OFFICE O/P EST SF 10 MIN: CPT | Performed by: FAMILY MEDICINE

## 2017-11-06 ASSESSMENT — PAIN SCALES - GENERAL: PAINLEVEL: NO PAIN

## 2017-11-06 NOTE — PROGRESS NOTES
"Subjective:      Riki Tan is a 57 y.o. male who presents with Follow-Up (lab results, flu, pnuemonia shots)            HPI 1. Idiopathic hypoparathyroidism-patient reports he has not had any recent palpitations, muscle spasms, unusual muscle weakness. He is continuing to work steadily as a . He anticipates repair of inguinal hernia in 5 days, scheduled.  2. Vaccines-patient requests flu vaccine and is interested in the pneumonia vaccine as well.  ROS negative chest pain, chest pressure, focal numbness       Objective:     /76   Pulse 100   Temp 36.3 °C (97.4 °F)   Resp 16   Ht 1.829 m (6' 0.01\")   Wt 86.6 kg (191 lb)   SpO2 95%   BMI 25.90 kg/m²       Physical Exam  Gen.- alert, cooperative, in no acute distress  Neck- midline trachea, thyroid not enlarged or tender,supple, no cervical adenopathy  Chest-clear to auscultation and percussion with normal breath sounds. No retractions. Chest wall nontender  Cardiac- regular rhythm and rate. No murmur, thrill, or heave  Neuro- intact light touch. Intact strength bilaterally. Normal gait. No tremor. Normal speech and     LABS: 11/2/17: Results reviewed and discussed with the patient, questions answered.   Notably PTH depressed at 2.7. Calcium mildly low at 8.3 with ionized calcium slightly low at 1.0. Phosphorus was mildly elevated at 5.2     Assessment/Plan:     1. Need for prophylactic vaccination with Streptococcus pneumoniae (Pneumococcus) and Influenza vaccines    - Flu Quad Inj >3 Year Pre-Filled PF  - PNEUMOCOCCAL POLYSACCHARIDE VACCINE 23-VALENT =>3YO SQ/IM    2. Idiopathic hypoparathyroidism (CMS-[facility])     Plan: 1. Recheck within 6 months  2. Flu vaccine today, Pneumovax 23 also    "

## 2017-11-07 NOTE — TELEPHONE ENCOUNTER
----- Message from Zev Calderón M.D. sent at 11/3/2017  5:33 PM PDT -----  Current labs show mild decline in parathyroid hormone 2.7 (5 years ago it was 2.0, 3.1  2 years ago). Ionized calcium is slightly low at 1.0 same level as 8 months ago and 10 months ago. Magnesium level is normal at 2.1. Serum electrolytes are normal on the chem panel, and calcium is mildly low at 8.3 improved from 8.08 months ago. Phosphorus is slightly high at 4.9 down from 5.28 months ago. Vitamin D is pending. Overall labs appear reasonably stable for his hypoparathyroidism.

## 2017-11-29 ENCOUNTER — APPOINTMENT (OUTPATIENT)
Dept: ADMISSIONS | Facility: MEDICAL CENTER | Age: 57
End: 2017-11-29
Attending: SURGERY
Payer: MEDICAID

## 2017-12-01 ENCOUNTER — HOSPITAL ENCOUNTER (OUTPATIENT)
Facility: MEDICAL CENTER | Age: 57
End: 2017-12-01
Attending: SURGERY | Admitting: SURGERY
Payer: MEDICAID

## 2017-12-01 VITALS
DIASTOLIC BLOOD PRESSURE: 87 MMHG | WEIGHT: 185.85 LBS | SYSTOLIC BLOOD PRESSURE: 137 MMHG | HEART RATE: 77 BPM | RESPIRATION RATE: 16 BRPM | OXYGEN SATURATION: 93 % | BODY MASS INDEX: 26.02 KG/M2 | TEMPERATURE: 98.6 F | HEIGHT: 71 IN

## 2017-12-01 PROCEDURE — 700111 HCHG RX REV CODE 636 W/ 250 OVERRIDE (IP)

## 2017-12-01 PROCEDURE — 700101 HCHG RX REV CODE 250

## 2017-12-01 PROCEDURE — 500002 HCHG ADHESIVE, DERMABOND: Performed by: SURGERY

## 2017-12-01 PROCEDURE — C1781 MESH (IMPLANTABLE): HCPCS | Performed by: SURGERY

## 2017-12-01 PROCEDURE — 501838 HCHG SUTURE GENERAL: Performed by: SURGERY

## 2017-12-01 PROCEDURE — 503366 HCHG TROCAR, 12X150 KII FIOS Z THR: Performed by: SURGERY

## 2017-12-01 PROCEDURE — 700102 HCHG RX REV CODE 250 W/ 637 OVERRIDE(OP)

## 2017-12-01 PROCEDURE — A9270 NON-COVERED ITEM OR SERVICE: HCPCS

## 2017-12-01 PROCEDURE — 160029 HCHG SURGERY MINUTES - 1ST 30 MINS LEVEL 4: Performed by: SURGERY

## 2017-12-01 PROCEDURE — 160009 HCHG ANES TIME/MIN: Performed by: SURGERY

## 2017-12-01 PROCEDURE — 160002 HCHG RECOVERY MINUTES (STAT): Performed by: SURGERY

## 2017-12-01 PROCEDURE — 160041 HCHG SURGERY MINUTES - EA ADDL 1 MIN LEVEL 4: Performed by: SURGERY

## 2017-12-01 PROCEDURE — 160036 HCHG PACU - EA ADDL 30 MINS PHASE I: Performed by: SURGERY

## 2017-12-01 PROCEDURE — 500868 HCHG NEEDLE, SURGI(VARES): Performed by: SURGERY

## 2017-12-01 PROCEDURE — 160025 RECOVERY II MINUTES (STATS): Performed by: SURGERY

## 2017-12-01 PROCEDURE — 502714 HCHG ROBOTIC SURGERY SERVICES: Performed by: SURGERY

## 2017-12-01 PROCEDURE — A6402 STERILE GAUZE <= 16 SQ IN: HCPCS | Performed by: SURGERY

## 2017-12-01 PROCEDURE — 160048 HCHG OR STATISTICAL LEVEL 1-5: Performed by: SURGERY

## 2017-12-01 PROCEDURE — 160047 HCHG PACU  - EA ADDL 30 MINS PHASE II: Performed by: SURGERY

## 2017-12-01 PROCEDURE — 160046 HCHG PACU - 1ST 60 MINS PHASE II: Performed by: SURGERY

## 2017-12-01 PROCEDURE — 160035 HCHG PACU - 1ST 60 MINS PHASE I: Performed by: SURGERY

## 2017-12-01 DEVICE — MESH PROGRIP LAPROSCOPIC SELF FIXATING (1/CA): Type: IMPLANTABLE DEVICE | Status: FUNCTIONAL

## 2017-12-01 RX ORDER — SODIUM CHLORIDE, SODIUM LACTATE, POTASSIUM CHLORIDE, CALCIUM CHLORIDE 600; 310; 30; 20 MG/100ML; MG/100ML; MG/100ML; MG/100ML
INJECTION, SOLUTION INTRAVENOUS CONTINUOUS
Status: DISCONTINUED | OUTPATIENT
Start: 2017-12-01 | End: 2017-12-01 | Stop reason: HOSPADM

## 2017-12-01 RX ORDER — MIDAZOLAM HYDROCHLORIDE 1 MG/ML
INJECTION INTRAMUSCULAR; INTRAVENOUS
Status: DISCONTINUED
Start: 2017-12-01 | End: 2017-12-01 | Stop reason: HOSPADM

## 2017-12-01 RX ORDER — BUPIVACAINE HYDROCHLORIDE AND EPINEPHRINE 5; 5 MG/ML; UG/ML
INJECTION, SOLUTION EPIDURAL; INTRACAUDAL; PERINEURAL
Status: DISCONTINUED | OUTPATIENT
Start: 2017-12-01 | End: 2017-12-01 | Stop reason: HOSPADM

## 2017-12-01 RX ORDER — LIDOCAINE AND PRILOCAINE 25; 25 MG/G; MG/G
1 CREAM TOPICAL
Status: DISCONTINUED | OUTPATIENT
Start: 2017-12-01 | End: 2017-12-01 | Stop reason: HOSPADM

## 2017-12-01 RX ORDER — OXYCODONE HCL 5 MG/5 ML
SOLUTION, ORAL ORAL
Status: COMPLETED
Start: 2017-12-01 | End: 2017-12-01

## 2017-12-01 RX ORDER — LIDOCAINE HYDROCHLORIDE 10 MG/ML
0.5 INJECTION, SOLUTION INFILTRATION; PERINEURAL
Status: DISCONTINUED | OUTPATIENT
Start: 2017-12-01 | End: 2017-12-01 | Stop reason: HOSPADM

## 2017-12-01 RX ADMIN — FENTANYL CITRATE 50 MCG: 50 INJECTION, SOLUTION INTRAMUSCULAR; INTRAVENOUS at 15:25

## 2017-12-01 RX ADMIN — OXYCODONE HYDROCHLORIDE 10 MG: 5 SOLUTION ORAL at 15:40

## 2017-12-01 RX ADMIN — SODIUM CHLORIDE, SODIUM LACTATE, POTASSIUM CHLORIDE, CALCIUM CHLORIDE 1000 ML: 600; 310; 30; 20 INJECTION, SOLUTION INTRAVENOUS at 13:15

## 2017-12-01 ASSESSMENT — PAIN SCALES - GENERAL
PAINLEVEL_OUTOF10: 2
PAINLEVEL_OUTOF10: 0
PAINLEVEL_OUTOF10: 3
PAINLEVEL_OUTOF10: 3
PAINLEVEL_OUTOF10: 5
PAINLEVEL_OUTOF10: 2

## 2017-12-01 NOTE — OR SURGEON
Immediate Post OP Note    PreOp Diagnosis: rih    PostOp Diagnosis: indirect RIH    Procedure(s):  INGUINAL HERNIA REPAIR ROBOTIC WITH MESH   - Wound Class: Clean Contaminated    Surgeon(s):  ABBIE Patricio M.D.    Anesthesiologist/Type of Anesthesia:  Anesthesiologist: Torin Flynn M.D./General    Surgical Staff:  Circulator: Xavier Sal R.N.; Ambar Plata RLIBIA; Chastity Muñiz RLIBIA  Scrub Person: ANGELY OvalleNJETT    Specimens:  * No specimens in log *    Estimated Blood Loss: 10    Findings: indirect   Separate ring closure     Complications: 0        12/1/2017 3:12 PM Zev Wick

## 2017-12-01 NOTE — PROGRESS NOTES
The Medication Reconciliation process has been completed by interviewing the patient who reports that he ran out of his Elmiron a few weeks ago.     Allergies have been reviewed  Antibiotic use in 30 days - none    [facility] Pharmacy:  [facility]

## 2017-12-02 NOTE — OP REPORT
DATE OF SERVICE:  12/01/2017    PREOPERATIVE DIAGNOSIS:  Right inguinal hernia.    POSTOPERATIVE DIAGNOSIS:  Indirect right inguinal hernia.    OPERATION PERFORMED:  Robotic-assisted laparoscopic transperitoneal repair of   indirect inguinal hernia with ring plasty and implantation of ProGrip mesh.    SURGEON:  Zev Wick MD    ANESTHESIOLOGIST:  Torin Flynn MD    ANESTHESIA:  General anesthesia.    ASSISTANT:  Artem Patrick MD    OPERATIVE NOTE:  The patient, 57 years of age, presents with symptomatic right   inguinal hernia.  He is felt to be a candidate for elective repair.  The   risks, possible complication of operation were explained to him in detail   along with the alternatives of approach.  He elected to proceed with   robotic-assisted laparoscopic repair.  He had a satisfactory preoperative   evaluation, received prophylactic antibiotics, had sequential stockings   applied as antiembolism prophylaxis.  He was taken to the operating room and   placed under anesthesia by Dr. Flynn.  His abdomen was shaved, prepped with   ChloraPrep solution, sterile drapes were applied.  A time-out was affected.    A solution of 0.5% Marcaine with epinephrine was liberally infiltrated in all   wounds.  An incision was made in the left upper quadrant in the anterior   axillary line.  The fascia was elevated.  This was pierced with a Veress   needle.  Saline drop test was permissive to proceed with step   pneumoinsufflation.  Once fully pneumoinsufflated, 8 mm robotic trocar was   placed.  The abdomen was surveyed using video laparoscopy.  There was no   evidence of injury related to entry.  He had a large indirect inguinal hernia.    The patient had a 12 mm trocar placed in the mid epigastrium and an 8 mm   trocar placed in right upper quadrant.  He was placed in slight Trendelenburg.    A transversus abdominis block was done on the right side with 10 mL of   Marcaine.  The patient then had the robot brought in  and docked.    Instrumentation and camera introduced.  Dr. Wick returned to the console.    Incision was made in the peritoneum above the pelvic outlet.  This was carried   medially to laterally.  A preperitoneal pocket was then created using   countertraction, sharp dissection, and electrocautery.  The patient had the   pelvic floor gradually exposed.  Dissection was carried down well below   Corona's ligament and dissection was carried around the cord structures.  He   had a large indirect sac, which was gradually mobilized and freed from the   spermatic cord structures.  The peritoneum was completely reduced where the   cord structures lay flat and unrestricted against the pelvic floor.  The   patient had lateral dissection carried down across the psoas.  The patient   then had two 15 cm Stratafix sutures introduced along with a piece of ProGrip   mesh a 10x15 cm.  One Stratafix suture was used to close the internal ring,   which was quite large from lateral to medial using running Stratafix in 2   layers.  The patient then had the ProGrip mesh unfurled to cover the pelvic   outlet completely.  It lay flat and unrestricted against the floor.  The   peritoneum was then closed over it using running 2-0 Stratafix suture.  This   was accomplished in 2 layers.  Both needles were embedded in the abdominal   wall.  The robot was brought in and undocked.  The patient had instrumentation   removed prior to that.  The patient had video laparoscopy ensued.  We were   able to retrieve both needles.  Counts were then correct.  The 12 mm trocar   site was removed and the fascia closed using 0 Vicryl suture.  The patient had   the wounds irrigated and closed using running 4-0 Monocryl and the wounds   were sealed with Dermabond.  The patient was awakened, extubated, and taken to   recovery room in stable satisfactory condition.  Intraoperative blood loss   was around 10 mL.  Sponge, instrument, and needle counts were reported  as   correct.  The patient was given a prescription for Dilaudid 4 mg #30 and   Zofran 4 mg #10.  He was asked to return to see us in the office in 1 week,   sooner if there is a problem in the interim.  He was given careful and   specific postoperative care instructions including narcotic use.  The patient   should call if his recovery deviates from that expected or if questions arise   in the interim between now and followup.  Procedure today was uncomplicated.       ____________________________________     MD FERNANDA PEREIRA / LAILA    DD:  12/01/2017 15:17:56  DT:  12/01/2017 17:47:41    D#:  [id number]  Job#:  [id number]    cc: KAMILA HERRERA MD, ELAINA CARVALHO MD

## 2017-12-02 NOTE — OR NURSING
1610 - Received pt from PACU [facility]. IRAIS, JACKLYN, reports pain 2/10.   Clothing to pt, pt dressing self. Rn placed call to pt's friend for pickup, no answer, message left

## 2017-12-02 NOTE — DISCHARGE INSTRUCTIONS
Hernia Repair  Care After  These instructions give you information on caring for yourself after your procedure. Your doctor may also give you more specific instructions. Call your doctor if you have any problems or questions after your procedure.  HOME CARE   · You may have changes in your poops (bowel movements).  · You may have loose or watery poop (diarrhea).  · You may be not able to poop.  · Your bowels will slowly get back to normal.  · Do not eat any food that makes you sick to your stomach (nauseous). Eat small meals 4 to 6 times a day instead of 3 large ones.  · Do not drink pop. It will give you gas.  · Do not drink alcohol.  · Do not lift anything heavier than 10 pounds. This is about the weight of a gallon of milk.  · Do not do anything that makes you very tired for at least 6 weeks.  · Do not get your wound wet for 2 days.  · You may take a sponge bath during this time.  · After 2 days you may take a shower. Gently pat your surgical cut (incision) dry with a towel. Do not rub it.  · For men: You may have been given an athletic supporter (scrotal support) before you left the hospital. It holds your scrotum and testicles closer to your body so there is no strain on your wound. Wear the supporter until your doctor tells you that you do not need it anymore.  GET HELP RIGHT AWAY IF:  · You have watery poop, or cannot poop for more than 3 days.  · You feel sick to your stomach or throw up (vomit) more than 2 or 3 times.  · You have temperature by mouth above 102° F (38.9° C).  · You see redness or puffiness (swelling) around your wound.  · You see yellowish white fluid (pus) coming from your wound.  · You see a bulge or bump in your lower belly (abdomen) or near your groin.  · You develop a rash, trouble breathing, or any other symptoms from medicines taken.  MAKE SURE YOU:  · Understand these instructions.  · Will watch your condition.  · Will get help right away if your are not doing well or get  worse.  Document Released: 11/30/2009 Document Revised: 03/11/2013 Document Reviewed: 11/30/2009  ExitCare® Patient Information ©2014 [employer].    ACTIVITY: Rest and take it easy for the first 24 hours.  A responsible adult is recommended to remain with you during that time.  It is normal to feel sleepy.  We encourage you to not do anything that requires balance, judgment or coordination.    MILD FLU-LIKE SYMPTOMS ARE NORMAL. YOU MAY EXPERIENCE GENERALIZED MUSCLE ACHES, THROAT IRRITATION, HEADACHE AND/OR SOME NAUSEA.    FOR 24 HOURS DO NOT:  Drive, operate machinery or run household appliances.  Drink beer or alcoholic beverages.   Make important decisions or sign legal documents.        DIET: To avoid nausea, slowly advance diet as tolerated, avoiding spicy or greasy foods for the first day.  Add more substantial food to your diet according to your physician's instructions.  Babies can be fed formula or breast milk as soon as they are hungry.  INCREASE FLUIDS AND FIBER TO AVOID CONSTIPATION.        FOLLOW-UP APPOINTMENT:  A follow-up appointment should be arranged with your doctor; call to schedule.    You should CALL YOUR PHYSICIAN if you develop:  Fever greater than 101 degrees F.  Pain not relieved by medication, or persistent nausea or vomiting.  Excessive bleeding (blood soaking through dressing) or unexpected drainage from the wound.  Extreme redness or swelling around the incision site, drainage of pus or foul smelling drainage.  Inability to urinate or empty your bladder within 8 hours.  Problems with breathing or chest pain.    You should call 911 if you develop problems with breathing or chest pain.  If you are unable to contact your doctor or surgical center, you should go to the nearest emergency room or urgent care center.  Physician's telephone #: [phone]      If any questions arise, call your doctor.  If your doctor is not available, please feel free to call the Surgical Center at  [phone].  The Center is open Monday through Friday from 7AM to 7PM.  You can also call the HEALTH HOTLINE open 24 hours/day, 7 days/week and speak to a nurse at [phone], or toll free at [phone].    A registered nurse may call you a few days after your surgery to see how you are doing after your procedure.    MEDICATIONS: Resume taking daily medication.  Take prescribed pain medication with food.  If no medication is prescribed, you may take non-aspirin pain medication if needed.  PAIN MEDICATION CAN BE VERY CONSTIPATING.  Take a stool softener or laxative such as senokot, pericolace, or milk of magnesia if needed.    Prescription given for Dilaudid & Phenergan.  Last pain medication given at 3:40p,.    If your physician has prescribed pain medication that includes Acetaminophen (Tylenol), do not take additional Acetaminophen (Tylenol) while taking the prescribed medication.    Depression / Suicide Risk    As you are discharged from this [facility] facility, it is important to learn how to keep safe from harming yourself.    Recognize the warning signs:  · Abrupt changes in personality, positive or negative- including increase in energy   · Giving away possessions  · Change in eating patterns- significant weight changes-  positive or negative  · Change in sleeping patterns- unable to sleep or sleeping all the time   · Unwillingness or inability to communicate  · Depression  · Unusual sadness, discouragement and loneliness  · Talk of wanting to die  · Neglect of personal appearance   · Rebelliousness- reckless behavior  · Withdrawal from people/activities they love  · Confusion- inability to concentrate     If you or a loved one observes any of these behaviors or has concerns about self-harm, here's what you can do:  · Talk about it- your feelings and reasons for harming yourself  · Remove any means that you might use to hurt yourself (examples: pills, rope, extension cords, firearm)  · Get  professional help from the community (Mental Health, Substance Abuse, psychological counseling)  · Do not be alone:Call your Safe Contact- someone whom you trust who will be there for you.  · Call your local CRISIS HOTLINE 441-7401 or 946-544-5033  · Call your local Children's Mobile Crisis Response Team [place] (150) 790-2718 or www.[employer]  · Call the toll free National Suicide Prevention Hotlines   · National Suicide Prevention Lifeline 365-235-VPDU (3358)  · National Hope Line Network 800-SUICIDE (728-7618)

## 2017-12-04 NOTE — ASSESSMENT & PLAN NOTE
Patient bg is still drinking at least 1 quart of beer per day and several times per week will have double or triple that amount. He denies any tremors, history of jaundice, seizures, or abdominal pain.   percutaneous

## 2018-01-26 DIAGNOSIS — E55.9 VITAMIN D DEFICIENCY: ICD-10-CM

## 2018-01-26 RX ORDER — CALCITRIOL 0.25 UG/1
CAPSULE, LIQUID FILLED ORAL
Qty: 90 CAP | Refills: 2 | Status: SHIPPED | OUTPATIENT
Start: 2018-01-26 | End: 2024-02-02 | Stop reason: SDUPTHER

## 2018-01-26 RX ORDER — ERGOCALCIFEROL 1.25 MG/1
CAPSULE ORAL
Qty: 4 CAP | Refills: 2 | Status: SHIPPED | OUTPATIENT
Start: 2018-01-26 | End: 2024-02-02

## 2018-01-31 ENCOUNTER — HOSPITAL ENCOUNTER (OUTPATIENT)
Dept: LAB | Facility: MEDICAL CENTER | Age: 58
End: 2018-01-31
Attending: PHYSICIAN ASSISTANT
Payer: MEDICAID

## 2018-01-31 LAB — PSA SERPL-MCNC: 2.06 NG/ML (ref 0–4)

## 2018-01-31 PROCEDURE — 84153 ASSAY OF PSA TOTAL: CPT

## 2018-01-31 PROCEDURE — 36415 COLL VENOUS BLD VENIPUNCTURE: CPT

## 2021-09-20 PROBLEM — S46.212A BICEPS RUPTURE, DISTAL, LEFT, INITIAL ENCOUNTER: Status: ACTIVE | Noted: 2021-09-20

## 2023-12-29 ENCOUNTER — APPOINTMENT (OUTPATIENT)
Dept: MEDICAL GROUP | Facility: MEDICAL CENTER | Age: 63
End: 2023-12-29
Payer: COMMERCIAL

## 2024-01-25 ENCOUNTER — OFFICE VISIT (OUTPATIENT)
Dept: MEDICAL GROUP | Facility: MEDICAL CENTER | Age: 64
End: 2024-01-25
Payer: COMMERCIAL

## 2024-01-25 VITALS
BODY MASS INDEX: 24.08 KG/M2 | OXYGEN SATURATION: 95 % | SYSTOLIC BLOOD PRESSURE: 144 MMHG | DIASTOLIC BLOOD PRESSURE: 82 MMHG | TEMPERATURE: 97.2 F | HEART RATE: 111 BPM | HEIGHT: 71 IN | WEIGHT: 172 LBS

## 2024-01-25 DIAGNOSIS — E78.5 HYPERLIPIDEMIA, UNSPECIFIED HYPERLIPIDEMIA TYPE: ICD-10-CM

## 2024-01-25 DIAGNOSIS — Z00.00 ENCOUNTER FOR MEDICAL EXAMINATION TO ESTABLISH CARE: Primary | ICD-10-CM

## 2024-01-25 DIAGNOSIS — Z13.0 SCREENING FOR DEFICIENCY ANEMIA: ICD-10-CM

## 2024-01-25 DIAGNOSIS — Z11.4 ENCOUNTER FOR SCREENING FOR HIV: ICD-10-CM

## 2024-01-25 DIAGNOSIS — Z12.5 SCREENING FOR PROSTATE CANCER: ICD-10-CM

## 2024-01-25 DIAGNOSIS — M16.11 ARTHRITIS OF RIGHT HIP: ICD-10-CM

## 2024-01-25 DIAGNOSIS — Z13.1 SCREENING FOR DIABETES MELLITUS: ICD-10-CM

## 2024-01-25 DIAGNOSIS — E20.0 IDIOPATHIC HYPOPARATHYROIDISM (HCC): ICD-10-CM

## 2024-01-25 DIAGNOSIS — M25.551 RIGHT HIP PAIN: ICD-10-CM

## 2024-01-25 DIAGNOSIS — Z12.11 COLON CANCER SCREENING: ICD-10-CM

## 2024-01-25 DIAGNOSIS — N18.30 CHRONIC RENAL FAILURE, STAGE 3 (MODERATE), UNSPECIFIED WHETHER STAGE 3A OR 3B CKD: ICD-10-CM

## 2024-01-25 DIAGNOSIS — R03.0 ELEVATED BP WITHOUT DIAGNOSIS OF HYPERTENSION: ICD-10-CM

## 2024-01-25 DIAGNOSIS — Z01.818 PRE-OP EXAM: ICD-10-CM

## 2024-01-25 DIAGNOSIS — Z11.59 ENCOUNTER FOR HEPATITIS C SCREENING TEST FOR LOW RISK PATIENT: ICD-10-CM

## 2024-01-25 DIAGNOSIS — R94.31 NONSPECIFIC ABNORMAL ELECTROCARDIOGRAM (ECG) (EKG): ICD-10-CM

## 2024-01-25 DIAGNOSIS — N30.10 CHRONIC INTERSTITIAL CYSTITIS: ICD-10-CM

## 2024-01-25 DIAGNOSIS — F10.10 ALCOHOL ABUSE: ICD-10-CM

## 2024-01-25 PROBLEM — S46.212A BICEPS RUPTURE, DISTAL, LEFT, INITIAL ENCOUNTER: Status: RESOLVED | Noted: 2021-09-20 | Resolved: 2024-01-25

## 2024-01-25 PROBLEM — M79.641 HAND PAIN, RIGHT: Status: RESOLVED | Noted: 2017-03-06 | Resolved: 2024-01-25

## 2024-01-25 PROCEDURE — 99204 OFFICE O/P NEW MOD 45 MIN: CPT | Performed by: FAMILY MEDICINE

## 2024-01-25 PROCEDURE — 3077F SYST BP >= 140 MM HG: CPT | Performed by: FAMILY MEDICINE

## 2024-01-25 PROCEDURE — 93000 ELECTROCARDIOGRAM COMPLETE: CPT | Performed by: FAMILY MEDICINE

## 2024-01-25 PROCEDURE — 3079F DIAST BP 80-89 MM HG: CPT | Performed by: FAMILY MEDICINE

## 2024-01-25 NOTE — PROGRESS NOTES
Riki Tan is a pleasant 63 y.o. male here to establish care.    HPI:   Problem   Elevated Bp Without Diagnosis of Hypertension    BP in clinic 144/82.  Visit 12/19/2023 with the [facility] blood pressure at that time was 118/64.  Denies any history of hypertension.     Encounter for Medical Examination to Establish Care    Riki is a 63-year-old male who is here to establish care.  Planning on undergoing a right hip replacement and needing surgical clearance.  Last time he was seen by anyone was his hernia surgery back in 2017.     Arthritis of Right Hip    Planning on right sided hip replacement, but needing medical clearance first.  Following with Dr. Gordon at [facility].  Notes right lower extremity swelling, utilizes crutches due to discomfort with ambulation.  He works as a  and has not been able to do as much as he used to due to the pain.     Chronic Renal Failure, Stage 3 (Moderate) (Hcc)    History of chronic kidney disease, had labs back in 2016 which showed a decline in his GFR.  Labs repeated back in 2017 which showed stable.  Has not had labs checked in the meantime.     Hyperlipidemia    Reports that he is concerned that he may have had a stroke at some point.  Denies any unilateral weakness reports weakness overall secondary to the pain associated with his right hip, this limits what he is able to do.  Requesting to have labs checked.     Interstitial cystitis    Reports a history of interstitial cystitis which cause some fibrosis to the bladder.  Was being followed by urology, has not seen them in some time.     Hypoparathyroidism (Hcc)    Taking 10 to 12 calcium pills daily, last time had his levels checked was in 2017.  Reports intermittent dysuria associated with interstitial cystitis.  Denies any muscle cramps or diarrhea symptoms     ETOH abuse    Admits to drinking almost on a daily basis 2 to 312 ounce cans of beers a day.  Last set of labs were back in 2017.     Right  Hip Pain (Resolved)   Biceps Rupture, Distal, Left, Initial Encounter (Resolved)   Hand Pain, Right (Resolved)    2016     Hypocalcemia (Resolved)   Dysuria (Resolved)          Current medicines (including changes today)  Current Outpatient Medications   Medication Sig Dispense Refill    calcitRIOL (ROCALTROL) 0.25 MCG Cap TAKE 1 CAPSULE ORALLY 3 TIMES DAILY PER DR. REID 90 Cap 2    vitamin D, Ergocalciferol, (DRISDOL) 14240 units Cap capsule TAKE 1 CAPSULE ORALLY EVERY SEVEN (7) DAYS 4 Cap 2    calcium carbonate-vitamin D (CALCIUM 500/D) 500-200 MG-UNIT TABS Take 2 Tabs by mouth every morning with breakfast.       No current facility-administered medications for this visit.       Past Medical/ Surgical History  He  has a past medical history of Backpain, Biceps rupture, distal, left, initial encounter (09/20/2021), BPH (benign prostatic hyperplasia), CATARACT, Dysuria (02/25/2011), Hyperlipidemia (01/09/2013), Hypocalcemia, Hypoparathyroidism (HCC), Prostate, Recurrent UTI, and Substance abuse (HCC).  He  has a past surgical history that includes other; other; and inguinal hernia repair robotic (Right, 12/1/2017).    Social History  Social History     Tobacco Use    Smoking status: Never    Smokeless tobacco: Former     Types: Chew     Quit date: 1/18/2024    Tobacco comments:     1 can/week   Vaping Use    Vaping Use: Never used   Substance Use Topics    Alcohol use: Yes     Alcohol/week: 15.0 oz     Types: 30 Cans of beer per week     Comment: Daily,  2 to 3 beers 12 oz cans    Drug use: No     Social History     Social History Narrative    Not on file        Family History  Family History   Problem Relation Age of Onset    Diabetes Mother         pre    Breast Cancer Mother     Colorectal Cancer Mother     No Known Problems Father     Lung Disease Neg Hx     Heart Disease Neg Hx     Stroke Neg Hx      Family Status   Relation Name Status    Mo  (Not Specified)    Fa  (Not Specified)    Neg Hx  (Not Specified)  "       Objective:     BP (!) 144/82   Pulse (!) 111   Temp 36.2 °C (97.2 °F)   Ht 1.803 m (5' 11\")   Wt 78 kg (172 lb)   SpO2 95%  Body mass index is 23.99 kg/m².    Physical Exam  Constitutional:       General: He is not in acute distress.  HENT:      Head: Normocephalic and atraumatic.      Right Ear: Tympanic membrane and external ear normal.      Left Ear: Tympanic membrane and external ear normal.      Nose: No nasal deformity.      Mouth/Throat:      Lips: Pink.      Mouth: Mucous membranes are moist.      Pharynx: Oropharynx is clear. Uvula midline. No posterior oropharyngeal erythema.   Eyes:      General: Lids are normal.      Extraocular Movements: Extraocular movements intact.      Conjunctiva/sclera: Conjunctivae normal.      Pupils: Pupils are equal, round, and reactive to light.   Neck:      Trachea: Trachea normal.   Cardiovascular:      Rate and Rhythm: Normal rate and regular rhythm.      Heart sounds: Normal heart sounds. No murmur heard.     No friction rub. No gallop.   Pulmonary:      Effort: Pulmonary effort is normal. No accessory muscle usage.      Breath sounds: Normal breath sounds. No wheezing or rales.   Abdominal:      General: Bowel sounds are normal.      Palpations: Abdomen is soft.      Tenderness: There is no abdominal tenderness.   Musculoskeletal:      Cervical back: Normal range of motion and neck supple.      Right lower le+ Edema present.      Left lower leg: No edema.   Lymphadenopathy:      Cervical: No cervical adenopathy.   Skin:     General: Skin is warm and dry.      Findings: No rash.   Neurological:      General: No focal deficit present.      Mental Status: He is alert and oriented to person, place, and time. Mental status is at baseline.      GCS: GCS eye subscore is 4. GCS verbal subscore is 5. GCS motor subscore is 6.      Motor: No weakness.      Gait: Gait is intact.   Psychiatric:         Attention and Perception: Attention normal.         Mood and " Affect: Mood and affect normal.         Speech: Speech normal.          Imaging:  No recent imaging to review    Labs:  No updated labs to review  Assessment and Plan:   The following treatment plan was discussed     Problem List Items Addressed This Visit       ETOH abuse    Hypoparathyroidism (HCC)     Chronic, presumed stable.  Will check CMP, PTH         Relevant Orders    Comp Metabolic Panel    TSH+FREE T4    PTH INTACT (PTH ONLY)    Interstitial cystitis     Chronic, presumed stable.  Continue to monitor symptoms, may need referral back over to urology.         Hyperlipidemia     Chronic, presumed stable.  Check lipid profile.         Relevant Orders    Lipid Profile    Chronic renal failure, stage 3 (moderate) (HCC)     Chronic, presumed stable.  Check CMP and GFR.         Relevant Orders    CBC WITH DIFFERENTIAL    Comp Metabolic Panel    ESTIMATED GFR    Arthritis of right hip     Chronic, unstable.  Physical exam: 1+ pitting edema to the right lower extremity  Will complete labs and EKG for preoperative clearance.         Elevated BP without diagnosis of hypertension     Acute.  Suspect elevation secondary to first-time visit in the clinic needing a medical clearance for surgery for chronic right hip pain.  I suspect the elevation is secondary to pain, stress of coming into a new clinic.  Planning on patient to return to the clinic in 1 week, will confirm blood pressure at that time.         Encounter for medical examination to establish care - Primary    RESOLVED: Right hip pain    Relevant Orders    EKG - Clinic Performed     Other Visit Diagnoses       Screening for deficiency anemia        Relevant Orders    CBC WITH DIFFERENTIAL    Screening for diabetes mellitus        Relevant Orders    Comp Metabolic Panel    ESTIMATED GFR    HEMOGLOBIN A1C    Nonspecific abnormal electrocardiogram (ECG) (EKG)        Relevant Orders    REFERRAL TO CARDIOLOGY    Pre-op exam        Relevant Orders    EKG - Clinic  Performed    Colon cancer screening        Relevant Orders    Referral to GI for Colonoscopy    Screening for prostate cancer        Relevant Orders    PROSTATE SPECIFIC AG SCREENING    Encounter for screening for HIV        Relevant Orders    HIV AG/AB COMBO ASSAY SCREENING    Encounter for hepatitis C screening test for low risk patient        Relevant Orders    HEP C VIRUS ANTIBODY           Followup: Return in about 1 week (around 2/1/2024) for review labs.    I have placed EKG orders.  The MA is preforming EKG orders under the direction of Dr. Mckeon    Please note that this dictation was created using voice recognition software. I have made every reasonable attempt to correct obvious errors, but I expect that there are errors of grammar and possibly content that I did not discover before finalizing the note.

## 2024-01-25 NOTE — ASSESSMENT & PLAN NOTE
Chronic, unstable.  Physical exam: 1+ pitting edema to the right lower extremity  Will complete labs and EKG for preoperative clearance.

## 2024-01-25 NOTE — ASSESSMENT & PLAN NOTE
Acute.  Suspect elevation secondary to first-time visit in the clinic needing a medical clearance for surgery for chronic right hip pain.  I suspect the elevation is secondary to pain, stress of coming into a new clinic.  Planning on patient to return to the clinic in 1 week, will confirm blood pressure at that time.

## 2024-01-26 ENCOUNTER — HOSPITAL ENCOUNTER (OUTPATIENT)
Dept: LAB | Facility: MEDICAL CENTER | Age: 64
End: 2024-01-26
Attending: INTERNAL MEDICINE
Payer: COMMERCIAL

## 2024-01-26 DIAGNOSIS — Z12.5 SCREENING FOR PROSTATE CANCER: ICD-10-CM

## 2024-01-26 DIAGNOSIS — Z13.1 SCREENING FOR DIABETES MELLITUS: ICD-10-CM

## 2024-01-26 DIAGNOSIS — N18.30 CHRONIC RENAL FAILURE, STAGE 3 (MODERATE), UNSPECIFIED WHETHER STAGE 3A OR 3B CKD: ICD-10-CM

## 2024-01-26 DIAGNOSIS — E20.0 IDIOPATHIC HYPOPARATHYROIDISM (HCC): ICD-10-CM

## 2024-01-26 DIAGNOSIS — Z11.59 ENCOUNTER FOR HEPATITIS C SCREENING TEST FOR LOW RISK PATIENT: ICD-10-CM

## 2024-01-26 DIAGNOSIS — E78.5 HYPERLIPIDEMIA, UNSPECIFIED HYPERLIPIDEMIA TYPE: ICD-10-CM

## 2024-01-26 DIAGNOSIS — Z11.4 ENCOUNTER FOR SCREENING FOR HIV: ICD-10-CM

## 2024-01-26 DIAGNOSIS — Z13.0 SCREENING FOR DEFICIENCY ANEMIA: ICD-10-CM

## 2024-01-26 LAB
ALBUMIN SERPL BCP-MCNC: 4 G/DL (ref 3.2–4.9)
ALBUMIN/GLOB SERPL: 1.7 G/DL
ALP SERPL-CCNC: 88 U/L (ref 30–99)
ALT SERPL-CCNC: 20 U/L (ref 2–50)
ANION GAP SERPL CALC-SCNC: 11 MMOL/L (ref 7–16)
AST SERPL-CCNC: 19 U/L (ref 12–45)
BASOPHILS # BLD AUTO: 1.5 % (ref 0–1.8)
BASOPHILS # BLD: 0.09 K/UL (ref 0–0.12)
BILIRUB SERPL-MCNC: 0.5 MG/DL (ref 0.1–1.5)
BUN SERPL-MCNC: 20 MG/DL (ref 8–22)
CALCIUM ALBUM COR SERPL-MCNC: 7.1 MG/DL (ref 8.5–10.5)
CALCIUM SERPL-MCNC: 7.1 MG/DL (ref 8.4–10.2)
CHLORIDE SERPL-SCNC: 101 MMOL/L (ref 96–112)
CHOLEST SERPL-MCNC: 146 MG/DL (ref 100–199)
CO2 SERPL-SCNC: 30 MMOL/L (ref 20–33)
CREAT SERPL-MCNC: 0.97 MG/DL (ref 0.5–1.4)
EOSINOPHIL # BLD AUTO: 0.32 K/UL (ref 0–0.51)
EOSINOPHIL NFR BLD: 5.4 % (ref 0–6.9)
ERYTHROCYTE [DISTWIDTH] IN BLOOD BY AUTOMATED COUNT: 42.9 FL (ref 35.9–50)
EST. AVERAGE GLUCOSE BLD GHB EST-MCNC: 108 MG/DL
FASTING STATUS PATIENT QL REPORTED: NORMAL
GFR SERPLBLD CREATININE-BSD FMLA CKD-EPI: 88 ML/MIN/1.73 M 2
GLOBULIN SER CALC-MCNC: 2.4 G/DL (ref 1.9–3.5)
GLUCOSE SERPL-MCNC: 82 MG/DL (ref 65–99)
HBA1C MFR BLD: 5.4 % (ref 4–5.6)
HCT VFR BLD AUTO: 41.5 % (ref 42–52)
HCV AB SER QL: NORMAL
HDLC SERPL-MCNC: 48 MG/DL
HGB BLD-MCNC: 14.3 G/DL (ref 14–18)
HIV 1+2 AB+HIV1 P24 AG SERPL QL IA: NORMAL
IMM GRANULOCYTES # BLD AUTO: 0.01 K/UL (ref 0–0.11)
IMM GRANULOCYTES NFR BLD AUTO: 0.2 % (ref 0–0.9)
LDLC SERPL CALC-MCNC: 77 MG/DL
LYMPHOCYTES # BLD AUTO: 0.49 K/UL (ref 1–4.8)
LYMPHOCYTES NFR BLD: 8.2 % (ref 22–41)
MCH RBC QN AUTO: 31.3 PG (ref 27–33)
MCHC RBC AUTO-ENTMCNC: 34.5 G/DL (ref 32.3–36.5)
MCV RBC AUTO: 90.8 FL (ref 81.4–97.8)
MONOCYTES # BLD AUTO: 0.6 K/UL (ref 0–0.85)
MONOCYTES NFR BLD AUTO: 10.1 % (ref 0–13.4)
NEUTROPHILS # BLD AUTO: 4.44 K/UL (ref 1.82–7.42)
NEUTROPHILS NFR BLD: 74.6 % (ref 44–72)
NRBC # BLD AUTO: 0 K/UL
NRBC BLD-RTO: 0 /100 WBC (ref 0–0.2)
PLATELET # BLD AUTO: 259 K/UL (ref 164–446)
PMV BLD AUTO: 10.3 FL (ref 9–12.9)
POTASSIUM SERPL-SCNC: 4.2 MMOL/L (ref 3.6–5.5)
PROT SERPL-MCNC: 6.4 G/DL (ref 6–8.2)
PSA SERPL-MCNC: 2.15 NG/ML (ref 0–4)
PTH-INTACT SERPL-MCNC: 3.9 PG/ML (ref 14–72)
RBC # BLD AUTO: 4.57 M/UL (ref 4.7–6.1)
SODIUM SERPL-SCNC: 142 MMOL/L (ref 135–145)
T4 FREE SERPL-MCNC: 1.52 NG/DL (ref 0.93–1.7)
TRIGL SERPL-MCNC: 106 MG/DL (ref 0–149)
TSH SERPL DL<=0.005 MIU/L-ACNC: 1.43 UIU/ML (ref 0.38–5.33)
WBC # BLD AUTO: 6 K/UL (ref 4.8–10.8)

## 2024-01-26 PROCEDURE — 80053 COMPREHEN METABOLIC PANEL: CPT

## 2024-01-26 PROCEDURE — 84153 ASSAY OF PSA TOTAL: CPT

## 2024-01-26 PROCEDURE — 84443 ASSAY THYROID STIM HORMONE: CPT

## 2024-01-26 PROCEDURE — 86803 HEPATITIS C AB TEST: CPT

## 2024-01-26 PROCEDURE — 83970 ASSAY OF PARATHORMONE: CPT

## 2024-01-26 PROCEDURE — 83036 HEMOGLOBIN GLYCOSYLATED A1C: CPT

## 2024-01-26 PROCEDURE — 85025 COMPLETE CBC W/AUTO DIFF WBC: CPT

## 2024-01-26 PROCEDURE — 87389 HIV-1 AG W/HIV-1&-2 AB AG IA: CPT

## 2024-01-26 PROCEDURE — 80061 LIPID PANEL: CPT

## 2024-01-26 PROCEDURE — 84439 ASSAY OF FREE THYROXINE: CPT

## 2024-01-26 PROCEDURE — 36415 COLL VENOUS BLD VENIPUNCTURE: CPT

## 2024-02-02 ENCOUNTER — OFFICE VISIT (OUTPATIENT)
Dept: MEDICAL GROUP | Facility: MEDICAL CENTER | Age: 64
End: 2024-02-02
Payer: COMMERCIAL

## 2024-02-02 ENCOUNTER — APPOINTMENT (OUTPATIENT)
Dept: MEDICAL GROUP | Facility: MEDICAL CENTER | Age: 64
End: 2024-02-02
Payer: COMMERCIAL

## 2024-02-02 VITALS
OXYGEN SATURATION: 97 % | BODY MASS INDEX: 23.94 KG/M2 | TEMPERATURE: 98.3 F | HEIGHT: 71 IN | HEART RATE: 109 BPM | WEIGHT: 171 LBS | DIASTOLIC BLOOD PRESSURE: 78 MMHG | SYSTOLIC BLOOD PRESSURE: 154 MMHG

## 2024-02-02 DIAGNOSIS — E20.0 IDIOPATHIC HYPOPARATHYROIDISM (HCC): ICD-10-CM

## 2024-02-02 DIAGNOSIS — E55.9 VITAMIN D DEFICIENCY: ICD-10-CM

## 2024-02-02 DIAGNOSIS — N18.30 CHRONIC RENAL FAILURE, STAGE 3 (MODERATE), UNSPECIFIED WHETHER STAGE 3A OR 3B CKD: ICD-10-CM

## 2024-02-02 DIAGNOSIS — M16.11 ARTHRITIS OF RIGHT HIP: ICD-10-CM

## 2024-02-02 PROCEDURE — 99214 OFFICE O/P EST MOD 30 MIN: CPT | Performed by: FAMILY MEDICINE

## 2024-02-02 PROCEDURE — 3078F DIAST BP <80 MM HG: CPT | Performed by: FAMILY MEDICINE

## 2024-02-02 PROCEDURE — 3077F SYST BP >= 140 MM HG: CPT | Performed by: FAMILY MEDICINE

## 2024-02-02 RX ORDER — CALCITRIOL 0.25 UG/1
0.25 CAPSULE, LIQUID FILLED ORAL 2 TIMES DAILY
Qty: 180 CAPSULE | Refills: 3 | Status: SHIPPED | OUTPATIENT
Start: 2024-02-02

## 2024-02-02 ASSESSMENT — FIBROSIS 4 INDEX: FIB4 SCORE: 1.02

## 2024-02-02 NOTE — ASSESSMENT & PLAN NOTE
Chronic, unstable.  -Reviewed labs with the patient  -Advised taking Calcitrol 0.25 mcg twice daily with calcium supplementation twice daily  -Check calcium levels in a few weeks

## 2024-02-02 NOTE — PROGRESS NOTES
"Riki Tan is a pleasant 63 y.o. male here for   Chief Complaint   Patient presents with    Follow-Up      HPI:   Problem   Arthritis of Right Hip    Planning on right sided hip replacement, but needing medical clearance first.  Following with Dr. Gordon at [facility].  Notes right lower extremity swelling, utilizes crutches due to discomfort with ambulation.  He works as a  and has not been able to do as much as he used to due to the pain.  Waiting for clearance for her to proceed with surgery.     Chronic Renal Failure, Stage 3 (Moderate) (Hcc)    History of chronic kidney disease, had labs back in 2016 which showed a decline in his GFR.  Recent completed labs which showed stable GFR 88     Hypoparathyroidism (Hcc)    Taking 10 to 12 calcium pills daily, last time had his levels checked was in 2017.  Reports intermittent dysuria associated with interstitial cystitis.  Denies any muscle cramps or diarrhea symptoms          Current Medicines (including changes today)  Current Outpatient Medications   Medication Sig Dispense Refill    calcitRIOL (ROCALTROL) 0.25 MCG Cap Take 1 Capsule by mouth 2 times a day. 180 Capsule 3    calcium carbonate-vitamin D (CALCIUM 500/D) 500-200 MG-UNIT TABS Take 2 Tabs by mouth every morning with breakfast.       No current facility-administered medications for this visit.     Past Medical/ Surgical History  He  has a past medical history of Backpain, Biceps rupture, distal, left, initial encounter (09/20/2021), BPH (benign prostatic hyperplasia), CATARACT, Dysuria (02/25/2011), Hyperlipidemia (01/09/2013), Hypocalcemia, Hypoparathyroidism ([facility]), Prostate, Recurrent UTI, and Substance abuse ([facility]).  He  has a past surgical history that includes other; other; and inguinal hernia repair robotic (Right, 12/1/2017).     Objective:     BP (!) 154/78   Pulse (!) 109   Temp 36.8 °C (98.3 °F)   Ht 1.803 m (5' 11\")   Wt 77.6 kg (171 lb)   SpO2 97%  Body mass index is 23.85 " kg/m².    Physical exam was completed through observation   Physical Exam  Constitutional:       General: He is not in acute distress.  HENT:      Head: Normocephalic and atraumatic.   Eyes:      Conjunctiva/sclera: Conjunctivae normal.      Pupils: Pupils are equal, round, and reactive to light.   Pulmonary:      Effort: Pulmonary effort is normal. No respiratory distress.   Abdominal:      General: There is no distension.   Musculoskeletal:      Cervical back: Normal range of motion and neck supple.      Right lower le+ Edema present.   Skin:     General: Skin is warm and dry.      Findings: No rash.   Neurological:      Mental Status: He is alert and oriented to person, place, and time.      Gait: Gait is intact.   Psychiatric:         Mood and Affect: Affect normal.          Imaging:  No new imaging    Labs  No updated labs    Assessment and Plan:   The following treatment plan was discussed     Problem List Items Addressed This Visit       Hypoparathyroidism (HCC)     Chronic, unstable.  -Reviewed labs with the patient  -Advised taking Calcitrol 0.25 mcg twice daily with calcium supplementation twice daily  -Check calcium levels in a few weeks         Relevant Orders    Comp Metabolic Panel    MAGNESIUM    Referral to Endocrinology    Vitamin D deficiency    Relevant Medications    calcitRIOL (ROCALTROL) 0.25 MCG Cap    Other Relevant Orders    Referral to Endocrinology    Chronic renal failure, stage 3 (moderate) (HCC)     Chronic, improved.  -Reviewed labs with the patient  -Continue to monitor GFR         Arthritis of right hip     Chronic, stable.  -Discussed referral to cardiology who needs to provide clearance for surgery             Followup: Return if symptoms worsen or fail to improve.      Please note that this dictation was created using voice recognition software. I have made every reasonable attempt to correct obvious errors, but I expect that there are errors of grammar and possibly content  that I did not discover before finalizing the note.

## 2024-02-09 ENCOUNTER — TELEPHONE (OUTPATIENT)
Dept: CARDIOLOGY | Facility: MEDICAL CENTER | Age: 64
End: 2024-02-09
Payer: COMMERCIAL

## 2024-02-09 NOTE — TELEPHONE ENCOUNTER
Spoke with pt and confirmed this will be their first time seeing a cardiologist. Confirmed that all recent labs, notes, and cardiac testing are in pts chart.     Appointment time, date, and location confirmed with patient.

## 2024-02-12 ENCOUNTER — HOSPITAL ENCOUNTER (OUTPATIENT)
Dept: LAB | Facility: MEDICAL CENTER | Age: 64
End: 2024-02-12
Attending: FAMILY MEDICINE
Payer: COMMERCIAL

## 2024-02-12 DIAGNOSIS — E20.0 IDIOPATHIC HYPOPARATHYROIDISM (HCC): ICD-10-CM

## 2024-02-12 LAB
ALBUMIN SERPL BCP-MCNC: 4.3 G/DL (ref 3.2–4.9)
ALBUMIN/GLOB SERPL: 1.8 G/DL
ALP SERPL-CCNC: 84 U/L (ref 30–99)
ALT SERPL-CCNC: 22 U/L (ref 2–50)
ANION GAP SERPL CALC-SCNC: 12 MMOL/L (ref 7–16)
AST SERPL-CCNC: 21 U/L (ref 12–45)
BILIRUB SERPL-MCNC: 0.5 MG/DL (ref 0.1–1.5)
BUN SERPL-MCNC: 24 MG/DL (ref 8–22)
CALCIUM ALBUM COR SERPL-MCNC: 7.1 MG/DL (ref 8.5–10.5)
CALCIUM SERPL-MCNC: 7.3 MG/DL (ref 8.4–10.2)
CHLORIDE SERPL-SCNC: 100 MMOL/L (ref 96–112)
CO2 SERPL-SCNC: 28 MMOL/L (ref 20–33)
CREAT SERPL-MCNC: 1.02 MG/DL (ref 0.5–1.4)
GFR SERPLBLD CREATININE-BSD FMLA CKD-EPI: 83 ML/MIN/1.73 M 2
GLOBULIN SER CALC-MCNC: 2.4 G/DL (ref 1.9–3.5)
GLUCOSE SERPL-MCNC: 86 MG/DL (ref 65–99)
MAGNESIUM SERPL-MCNC: 1.8 MG/DL (ref 1.5–2.5)
POTASSIUM SERPL-SCNC: 3.9 MMOL/L (ref 3.6–5.5)
PROT SERPL-MCNC: 6.7 G/DL (ref 6–8.2)
SODIUM SERPL-SCNC: 140 MMOL/L (ref 135–145)

## 2024-02-12 PROCEDURE — 83735 ASSAY OF MAGNESIUM: CPT

## 2024-02-12 PROCEDURE — 80053 COMPREHEN METABOLIC PANEL: CPT

## 2024-02-12 PROCEDURE — 36415 COLL VENOUS BLD VENIPUNCTURE: CPT

## 2024-02-13 ENCOUNTER — OFFICE VISIT (OUTPATIENT)
Dept: CARDIOLOGY | Facility: MEDICAL CENTER | Age: 64
End: 2024-02-13
Attending: FAMILY MEDICINE
Payer: COMMERCIAL

## 2024-02-13 VITALS
BODY MASS INDEX: 23.66 KG/M2 | HEIGHT: 71 IN | DIASTOLIC BLOOD PRESSURE: 70 MMHG | OXYGEN SATURATION: 98 % | HEART RATE: 95 BPM | RESPIRATION RATE: 16 BRPM | SYSTOLIC BLOOD PRESSURE: 112 MMHG | WEIGHT: 169 LBS

## 2024-02-13 DIAGNOSIS — M25.471 SWELLING OF ANKLE, RIGHT: ICD-10-CM

## 2024-02-13 DIAGNOSIS — Z01.810 PRE-OPERATIVE CARDIOVASCULAR EXAMINATION: ICD-10-CM

## 2024-02-13 LAB — EKG IMPRESSION: NORMAL

## 2024-02-13 PROCEDURE — 99211 OFF/OP EST MAY X REQ PHY/QHP: CPT | Performed by: INTERNAL MEDICINE

## 2024-02-13 PROCEDURE — 3074F SYST BP LT 130 MM HG: CPT | Performed by: INTERNAL MEDICINE

## 2024-02-13 PROCEDURE — 93010 ELECTROCARDIOGRAM REPORT: CPT | Performed by: INTERNAL MEDICINE

## 2024-02-13 PROCEDURE — 93005 ELECTROCARDIOGRAM TRACING: CPT | Performed by: INTERNAL MEDICINE

## 2024-02-13 PROCEDURE — 3078F DIAST BP <80 MM HG: CPT | Performed by: INTERNAL MEDICINE

## 2024-02-13 PROCEDURE — 99204 OFFICE O/P NEW MOD 45 MIN: CPT | Performed by: INTERNAL MEDICINE

## 2024-02-13 ASSESSMENT — FIBROSIS 4 INDEX: FIB4 SCORE: 1.07

## 2024-02-13 NOTE — PROGRESS NOTES
"    Interventional cardiology Initial Consultation Note      Chief Complaint: Preoperative cardiovascular examination    Riki Tan is a 63 y.o. male  patient presented today for preoperative cardiovascular examination.  He has severe right hip pain, his EKG was slightly abnormal, he was referred for cardiology opinion prior to hip surgery.  He denies chest pain, shortness of breath.  He chews tobacco but does not smoke.        Past Medical History:   Diagnosis Date    Backpain     Biceps rupture, distal, left, initial encounter 09/20/2021    BPH (benign prostatic hyperplasia)     CATARACT     marie IOL    Dysuria 02/25/2011    Hyperlipidemia 01/09/2013    Hypocalcemia     Hypoparathyroidism (HCC)     Prostate     Recurrent UTI     Substance abuse (HCC)     etoh             Current Outpatient Medications   Medication Sig Dispense Refill    calcitRIOL (ROCALTROL) 0.25 MCG Cap Take 1 Capsule by mouth 2 times a day. 180 Capsule 3    calcium carbonate-vitamin D (CALCIUM 500/D) 500-200 MG-UNIT TABS Take 2 Tabs by mouth every morning with breakfast.       No current facility-administered medications for this visit.             Physical Exam:  Ambulatory Vitals  /70 (BP Location: Left arm, Patient Position: Sitting, BP Cuff Size: Adult)   Pulse 95   Resp 16   Ht 1.803 m (5' 11\")   Wt 76.7 kg (169 lb)   SpO2 98%    Oxygen Therapy:  Pulse Oximetry: 98 %  BP Readings from Last 4 Encounters:   02/13/24 112/70   02/02/24 (!) 154/78   01/25/24 (!) 144/82   12/01/17 137/87       Weight/BMI: Body mass index is 23.57 kg/m².  Wt Readings from Last 4 Encounters:   02/13/24 76.7 kg (169 lb)   02/02/24 77.6 kg (171 lb)   01/25/24 78 kg (172 lb)   09/17/21 86.2 kg (190 lb)           General: Well appearing and in no apparent distress  Neck: carotid bruits absent  Lungs: respiratory sounds  normal  Heart: Regular rhythm,  No palpable thrills on palpation, murmurs absent, no rubs,   Right lower extremity edema " present    EKG sinus rhythm, PACs, nonspecific repolarization abnormality.        Medical Decision Making:  Problem List Items Addressed This Visit    None  Visit Diagnoses       Swelling of ankle, right        Relevant Orders    US-EXTREMITY VENOUS LOWER UNILAT RIGHT    US-EXTREMITY ARTERY LOWER UNILAT RIGHT    Pre-operative cardiovascular examination        Relevant Orders    EKG - Clinic Performed (Completed)          Repeat EKG shows sinus rhythm, PACs, nonspecific ST changes.  He has no symptoms of chest pain, shortness of breath.,  His functional capacity is at least 5 METS, from cardiac standpoint no need for additional testing at this time, he is at low risk for cardiovascular events.  He has significant right lower extremity edema.  Recommend lower extremity venous Doppler, limited MARCELLA to rule out circulatory issues.          This note was dictated using Dragon speech recognition software.    Jorge PATRICK  Interventional cardiologist  [facility], [facility]  [address] [address]  Phone: [phone]  Fax: [phone]

## 2024-02-15 ENCOUNTER — TELEPHONE (OUTPATIENT)
Dept: MEDICAL GROUP | Facility: MEDICAL CENTER | Age: 64
End: 2024-02-15
Payer: COMMERCIAL

## 2024-02-15 DIAGNOSIS — E55.9 VITAMIN D DEFICIENCY: ICD-10-CM

## 2024-02-15 DIAGNOSIS — E20.0 IDIOPATHIC HYPOPARATHYROIDISM (HCC): ICD-10-CM

## 2024-02-15 NOTE — TELEPHONE ENCOUNTER
VOICEMAIL  1. Caller Name: ric                      Call Back Number: [phone]    2. Message: called to ask how or what to go about a referral for the surgery he is needing    3. Patient approves office to leave a detailed voicemail/MyChart message: yes

## 2024-02-16 ENCOUNTER — HOSPITAL ENCOUNTER (OUTPATIENT)
Dept: RADIOLOGY | Facility: MEDICAL CENTER | Age: 64
End: 2024-02-16
Attending: INTERNAL MEDICINE
Payer: COMMERCIAL

## 2024-02-16 DIAGNOSIS — M25.471 SWELLING OF ANKLE, RIGHT: ICD-10-CM

## 2024-02-16 PROCEDURE — 93922 UPR/L XTREMITY ART 2 LEVELS: CPT

## 2024-02-16 PROCEDURE — 93971 EXTREMITY STUDY: CPT | Mod: RT

## 2024-02-16 PROCEDURE — 93922 UPR/L XTREMITY ART 2 LEVELS: CPT | Mod: 26 | Performed by: INTERNAL MEDICINE

## 2024-02-16 PROCEDURE — 93971 EXTREMITY STUDY: CPT | Mod: 26,RT | Performed by: INTERNAL MEDICINE

## 2024-03-18 ENCOUNTER — OFFICE VISIT (OUTPATIENT)
Dept: MEDICAL GROUP | Facility: MEDICAL CENTER | Age: 64
End: 2024-03-18
Payer: COMMERCIAL

## 2024-03-18 VITALS
HEIGHT: 71 IN | TEMPERATURE: 97.6 F | BODY MASS INDEX: 23.94 KG/M2 | WEIGHT: 171 LBS | DIASTOLIC BLOOD PRESSURE: 74 MMHG | SYSTOLIC BLOOD PRESSURE: 134 MMHG

## 2024-03-18 DIAGNOSIS — Z01.818 PREOPERATIVE CLEARANCE: ICD-10-CM

## 2024-03-18 DIAGNOSIS — E78.5 HYPERLIPIDEMIA, UNSPECIFIED HYPERLIPIDEMIA TYPE: ICD-10-CM

## 2024-03-18 DIAGNOSIS — F10.10 ALCOHOL ABUSE: ICD-10-CM

## 2024-03-18 DIAGNOSIS — N52.8 OTHER MALE ERECTILE DYSFUNCTION: ICD-10-CM

## 2024-03-18 DIAGNOSIS — N18.30 CHRONIC RENAL FAILURE, STAGE 3 (MODERATE), UNSPECIFIED WHETHER STAGE 3A OR 3B CKD: ICD-10-CM

## 2024-03-18 DIAGNOSIS — M16.11 ARTHRITIS OF RIGHT HIP: ICD-10-CM

## 2024-03-18 DIAGNOSIS — R03.0 ELEVATED BP WITHOUT DIAGNOSIS OF HYPERTENSION: ICD-10-CM

## 2024-03-18 PROCEDURE — 3078F DIAST BP <80 MM HG: CPT | Performed by: FAMILY MEDICINE

## 2024-03-18 PROCEDURE — 3075F SYST BP GE 130 - 139MM HG: CPT | Performed by: FAMILY MEDICINE

## 2024-03-18 PROCEDURE — 99213 OFFICE O/P EST LOW 20 MIN: CPT | Performed by: FAMILY MEDICINE

## 2024-03-18 RX ORDER — SILDENAFIL 100 MG/1
100 TABLET, FILM COATED ORAL
Qty: 20 TABLET | Refills: 3 | Status: SHIPPED | OUTPATIENT
Start: 2024-03-18

## 2024-03-18 ASSESSMENT — FIBROSIS 4 INDEX: FIB4 SCORE: 1.07

## 2024-03-18 NOTE — ASSESSMENT & PLAN NOTE
Recommendations:  Chronic medical conditions: Stable and controlled. Continue current medicines.   Avoid drugs that potentiate bleeding as advised by surgeon  Discontinue all herbal supplements 2 weeks prior to surgery.  Riki Tan is undergoing an elective, right hip replacement Surgery. he  has RCRI 0 risk (0 points) with 3.9 % 30-day risk of death, MI, or cardiac arrest.   Riki Tan can proceed without further testing.

## 2024-03-18 NOTE — PROGRESS NOTES
PRE-OPERATIVE EXAMINATION        Riki Tan is a 63 y.o. male here for   Chief Complaint   Patient presents with    Medical Clearance    who is planning to undergo right hip replacement under General by Dr. Gordon on TBA.  Patient has not had complications with anesthesia in the past.    Review of systems:    Chest pain:  no  Shortness of breath: no  Shortness of breath with exertion: no  Orthopnea: no  Dizziness: no  Unexplained weight change:no    PMH:   has a past medical history of Backpain, Biceps rupture, distal, left, initial encounter (09/20/2021), BPH (benign prostatic hyperplasia), CATARACT, Dysuria (02/25/2011), Hyperlipidemia (01/09/2013), Hypocalcemia, Hypoparathyroidism (HCC), Prostate, Recurrent UTI, and Substance abuse (HCC).    CAD: yes, stable  HTN: no  CKD: no  DM:  no  History of CVA: no    Problem   Other Male Erectile Dysfunction    Chronic right hip pain, present for a while.  Occasional am eriection.  Would like to try Viagra.  Feels that most of his difficulties are due to his chronic right hip pain.        Surgical and anesthetic history:  has a past surgical history that includes other; other; and inguinal hernia repair robotic (Right, 12/1/2017). Prior surgery without complication, bleeding, reaction to anesthetic, prolonged recovery  Habits:   Social History     Tobacco Use    Smoking status: Never    Smokeless tobacco: Former     Types: Chew     Quit date: 1/18/2024    Tobacco comments:     1 can/week   Vaping Use    Vaping Use: Never used   Substance Use Topics    Alcohol use: Yes     Alcohol/week: 15.0 oz     Types: 30 Cans of beer per week     Comment: Daily,  2 to 3 beers 12 oz cans    Drug use: No     Allergies: Nkda [no known drug allergy] No known allergy to Anesthetic, or Latex.     Current medicines:   Current Outpatient Medications   Medication Sig Dispense Refill    sildenafil citrate (VIAGRA) 100 MG tablet Take 1 Tablet by mouth 1 time a day as needed for Erectile  "Dysfunction. 20 Tablet 3    calcitRIOL (ROCALTROL) 0.25 MCG Cap Take 1 Capsule by mouth 2 times a day. 180 Capsule 3    calcium carbonate-vitamin D (CALCIUM 500/D) 500-200 MG-UNIT TABS Take 2 Tabs by mouth every morning with breakfast.       No current facility-administered medications for this visit.     Anticoagulant: ASA, NSAIDs   Herbals: Black Cohash, Echinacea, Garlic, Irish, Ginkgo Biloba, Ginseng, Kava, Katarzyna’s Wort,  Saw Palmetto, Valerian             Functional Status: ambulatory, uses assistive device walker.    Social History     Tobacco Use    Smoking status: Never    Smokeless tobacco: Former     Types: Chew     Quit date: 1/18/2024    Tobacco comments:     1 can/week   Vaping Use    Vaping Use: Never used   Substance Use Topics    Alcohol use: Yes     Alcohol/week: 15.0 oz     Types: 30 Cans of beer per week     Comment: Daily,  2 to 3 beers 12 oz cans    Drug use: No         Objective:     /74   Temp 36.4 °C (97.6 °F)   Ht 1.803 m (5' 11\")   Wt 77.6 kg (171 lb)  Body mass index is 23.85 kg/m².     Physical Exam  Constitutional:       General: He is not in acute distress.     Appearance: He is not ill-appearing or toxic-appearing.   HENT:      Head: Normocephalic.      Right Ear: Tympanic membrane and external ear normal.      Left Ear: Tympanic membrane and external ear normal.      Nose: Nose normal. No rhinorrhea.      Mouth/Throat:      Mouth: Mucous membranes are moist.      Pharynx: Oropharynx is clear. No posterior oropharyngeal erythema.   Eyes:      General:         Right eye: No discharge.         Left eye: No discharge.      Conjunctiva/sclera: Conjunctivae normal.      Pupils: Pupils are equal, round, and reactive to light.   Cardiovascular:      Rate and Rhythm: Normal rate and regular rhythm.      Heart sounds: No murmur heard.  Pulmonary:      Effort: Pulmonary effort is normal. No respiratory distress.      Breath sounds: Normal breath sounds. No wheezing.   Abdominal:      " General: Abdomen is flat.   Musculoskeletal:         General: No swelling or tenderness.      Cervical back: Normal range of motion and neck supple.      Right lower leg: No edema.      Left lower leg: No edema.   Skin:     General: Skin is warm.   Neurological:      General: No focal deficit present.      Mental Status: He is alert and oriented to person, place, and time. Mental status is at baseline.   Psychiatric:         Mood and Affect: Mood normal.          Revised Cardiac Risk Index (RCRI) for Pre-Operative Risk   (estimates risk of cardiac complications after noncardiac surgery)  High-risk surgery: No 0 / Yes +1  Intraperitoneal, intrathoracic, suprainguinal vascular  History of ischemic heart disease: No 0 / Yes +1  Hx of MI, (+) exercise test, current chest pain considered due to myocardial ischemia, use of nitrate therapy or ECG with pathological Q waves)  History of CHF: No 0 / Yes +1  Pulmonary edema, B/L rales or S3 gallop; SANDOVAL, orthopnea, PND, CXR showing pulmonary vascular redistribution)  History of cerebrovascular disease: No 0 / Yes +1  Prior TIA or stroke  Pre-operative treatment with insulin: No 0 / Yes +1  Pre-operative creatinine >2 mg/dL: No 0 / Yes +1    RCRI Scorin points: Class I Risk, 3.9% 30-day risk of death, MI, or cardiac arrest  1 point: Class II Risk, 6.0% 30-day risk of death, MI, or cardiac arrest  2 points: Class III Risk, 10.1% 30-day risk of death, MI, or cardiac arrest  3 points: Class IV Risk, 15% 30-day risk of death, MI, or cardiac arrest  4 points: Class IV Risk, 15% 30-day risk of death, MI, or cardiac arrest  5 points: Class IV Risk, 15% 30-day risk of death, MI, or cardiac arrest  6 points: Class IV Risk, 15% 30-day risk of death, MI, or cardiac arrest    Lab Results   Component Value Date/Time    CREATININE 1.02 2024 12:47 PM    CREATININE 1.0 2007 05:40 AM         Assessment and Plan:     Reviewed note from cardiology reporting that he has a low  cardiovascular event for surgery and can proceed without further testing.    Problem List Items Addressed This Visit       ETOH abuse    Hyperlipidemia    Relevant Medications    sildenafil citrate (VIAGRA) 100 MG tablet    Chronic renal failure, stage 3 (moderate) (HCC)    Arthritis of right hip    Elevated BP without diagnosis of hypertension    Other male erectile dysfunction     New diagnosis for the patient.  -Will trial Viagra start at 50 mg 30 minutes prior to activity can increase to 100 mg once daily as needed         Relevant Medications    sildenafil citrate (VIAGRA) 100 MG tablet    Preoperative clearance     Recommendations:  Chronic medical conditions: Stable and controlled. Continue current medicines.   Avoid drugs that potentiate bleeding as advised by surgeon  Discontinue all herbal supplements 2 weeks prior to surgery.  Riki Tan is undergoing an elective, right hip replacement Surgery. he  has RCRI 0 risk (0 points) with 3.9 % 30-day risk of death, MI, or cardiac arrest.   Riki Tan can proceed without further testing.             Please note that this dictation was created using voice recognition software. I have made every reasonable attempt to correct obvious errors, but I expect that there are errors of grammar and possibly content that I did not discover before finalizing the note.

## 2024-03-18 NOTE — ASSESSMENT & PLAN NOTE
New diagnosis for the patient.  -Will trial Viagra start at 50 mg 30 minutes prior to activity can increase to 100 mg once daily as needed

## 2024-03-29 PROBLEM — M16.11 PRIMARY OSTEOARTHRITIS OF RIGHT HIP: Status: ACTIVE | Noted: 2024-03-29

## 2024-04-01 DIAGNOSIS — Z47.1 AFTERCARE FOLLOWING RIGHT HIP JOINT REPLACEMENT SURGERY: ICD-10-CM

## 2024-04-01 DIAGNOSIS — Z96.641 AFTERCARE FOLLOWING RIGHT HIP JOINT REPLACEMENT SURGERY: ICD-10-CM

## 2024-07-09 DIAGNOSIS — N52.8 OTHER MALE ERECTILE DYSFUNCTION: ICD-10-CM

## 2024-07-09 RX ORDER — SILDENAFIL 100 MG/1
100 TABLET, FILM COATED ORAL
Qty: 20 TABLET | Refills: 5 | Status: SHIPPED | OUTPATIENT
Start: 2024-07-09

## 2024-09-16 ENCOUNTER — OFFICE VISIT (OUTPATIENT)
Dept: MEDICAL GROUP | Facility: MEDICAL CENTER | Age: 64
End: 2024-09-16
Payer: COMMERCIAL

## 2024-09-16 ENCOUNTER — NURSE TRIAGE (OUTPATIENT)
Dept: MEDICAL GROUP | Facility: MEDICAL CENTER | Age: 64
End: 2024-09-16

## 2024-09-16 VITALS
HEART RATE: 85 BPM | OXYGEN SATURATION: 96 % | SYSTOLIC BLOOD PRESSURE: 132 MMHG | HEIGHT: 72 IN | WEIGHT: 184 LBS | BODY MASS INDEX: 24.92 KG/M2 | TEMPERATURE: 98.4 F | DIASTOLIC BLOOD PRESSURE: 80 MMHG | RESPIRATION RATE: 18 BRPM

## 2024-09-16 DIAGNOSIS — E55.9 VITAMIN D DEFICIENCY: ICD-10-CM

## 2024-09-16 DIAGNOSIS — F10.10 ALCOHOL ABUSE: ICD-10-CM

## 2024-09-16 DIAGNOSIS — R55 SYNCOPE, UNSPECIFIED SYNCOPE TYPE: ICD-10-CM

## 2024-09-16 DIAGNOSIS — E20.0 IDIOPATHIC HYPOPARATHYROIDISM (HCC): ICD-10-CM

## 2024-09-16 PROCEDURE — 3075F SYST BP GE 130 - 139MM HG: CPT | Performed by: FAMILY MEDICINE

## 2024-09-16 PROCEDURE — 99214 OFFICE O/P EST MOD 30 MIN: CPT | Performed by: FAMILY MEDICINE

## 2024-09-16 PROCEDURE — 93000 ELECTROCARDIOGRAM COMPLETE: CPT | Performed by: FAMILY MEDICINE

## 2024-09-16 PROCEDURE — 3079F DIAST BP 80-89 MM HG: CPT | Performed by: FAMILY MEDICINE

## 2024-09-16 RX ORDER — CALCITRIOL 0.25 UG/1
0.25 CAPSULE, LIQUID FILLED ORAL 2 TIMES DAILY
Qty: 180 CAPSULE | Refills: 3 | Status: SHIPPED | OUTPATIENT
Start: 2024-09-16

## 2024-09-16 ASSESSMENT — FIBROSIS 4 INDEX: FIB4 SCORE: 1.09

## 2024-09-16 ASSESSMENT — PATIENT HEALTH QUESTIONNAIRE - PHQ9: CLINICAL INTERPRETATION OF PHQ2 SCORE: 0

## 2024-09-16 NOTE — PROGRESS NOTES
"Verbal consent was acquired by the patient to use [employer] ambient listening note generation during this visit    Subjective:     CC: \"syncope\"    History of Present Illness  The patient presents for evaluation of syncope.    He experienced a syncopal episode a few nights ago, which was preceded by alcohol consumption and Viagra use. During this episode, he was unresponsive and unable to move or speak for approximately 5 minutes. His wife may have observed him having a seizure-like episode with some shaking or tremors but denies post ictal phase and may have been conscious for the entire episode.. Following the episode, they went out and walked around downtown, he experienced soreness in his right leg but otherwise felt like his normal self. He reports no changes in vision. He typically consumes 1 to 2 beers daily. He has not had any recent lab work done. He reports no chest pain, shortness of breath, or palpitations.    He also has a history of low calcium levels due to non-functioning parathyroid glands, for which he was previously hospitalized for 5 days. He is prescribed calcitriol but has misplaced it and requires a refill. He admits to not taking sufficient vitamin D.    Additionally, he had hip surgery a couple of months ago.          Objective:     Exam:  /80   Pulse 85   Temp 36.9 °C (98.4 °F) (Temporal)   Resp 18   Ht 1.829 m (6')   Wt 83.5 kg (184 lb)   SpO2 96%   BMI 24.95 kg/m²  Body mass index is 24.95 kg/m².    Physical Exam  Vitals reviewed.   Constitutional:       General: He is not in acute distress.     Appearance: Normal appearance.   HENT:      Head: Normocephalic and atraumatic.   Cardiovascular:      Rate and Rhythm: Normal rate and regular rhythm.      Heart sounds: Normal heart sounds.   Pulmonary:      Effort: Pulmonary effort is normal. No respiratory distress.      Breath sounds: Normal breath sounds.   Skin:     General: Skin is warm and dry.   Neurological:      Mental " Status: He is alert. Mental status is at baseline.      Cranial Nerves: Cranial nerves 2-12 are intact.      Motor: No weakness, tremor, abnormal muscle tone or seizure activity.      Coordination: Coordination normal. Finger-Nose-Finger Test (first attempt of finger to nose he did miss my finger but patient requested to repeat and when he slowed down and concentrated a bit more was on target) and Heel to Shin Test normal. Rapid alternating movements normal.      Gait: Gait normal.   Psychiatric:         Mood and Affect: Mood normal.         Behavior: Behavior normal.             Results  Testing  EKG shows long QT or QTc.    EKG Interpretation   Ordered and interpreted by Adama Sherman DO  Rhythm: normal sinus   Rate: 79   Axis: normal   Ectopy: none   Conduction:   QTc 507  ST Segments: no acute change   T Waves: no acute change   Q Waves: none   Clinical Impression: Recent EKG 2/13/2024 prior to that it is in 2010.  In clinic EKG appears more similar to the EKG from 2010 without any obvious arrhythmia or signs of ischemia.  Low voltage appears similar to his EKG from 2010.  QTc prolonged but stable in the low 500s.    Assessment & Plan:       1. Syncope, unspecified syncope type  - EKG - Clinic Performed  - CBC WITHOUT DIFFERENTIAL; Future  - Comp Metabolic Panel; Future  - TSH WITH REFLEX TO FT4; Future  - CRP QUANTITIVE (NON-CARDIAC); Future  - proBrain Natriuretic Peptide, NT; Future  - DX-CHEST-2 VIEWS; Future    2. Vitamin D deficiency  - calcitRIOL (ROCALTROL) 0.25 MCG Cap; Take 1 Capsule by mouth 2 times a day.  Dispense: 180 Capsule; Refill: 3  - PTH WITH CALCIUM; Future  - Comp Metabolic Panel; Future  - VITAMIN D,25 HYDROXY (DEFICIENCY); Future    3. Idiopathic hypoparathyroidism (HCC)  - PTH WITH CALCIUM; Future  - TSH WITH REFLEX TO FT4; Future    4. ETOH abuse  - Comp Metabolic Panel; Future      Assessment & Plan  1. Syncope.  The syncope episode may be due to a combination of alcohol  "consumption, Viagra use, and hot tub exposure, which likely caused a significant drop in blood pressure. His blood pressure and heart rate are currently stable, and the neurologic examination is largely normal. The EKG does not reveal any significant abnormalities. He is advised to maintain hydration, avoid Viagra use prior to hot tub exposure, and limit alcohol consumption. Lab work, including PTH and calcium levels, will be ordered. A chest x-ray will also be obtained to rule out any underlying cardiac issues.  Patient tells me \"I am only here because I promised my wife.\"  He prefers not to have significant workup but was agreeable to labs and x-ray.    2. Hypocalcemia.  He has a history of low calcium levels due to non-functioning parathyroid glands. He was previously hospitalized for five days due to critically low calcium levels. He has not been taking his prescribed calcitriol. A prescription for calcitriol will be provided. He is also advised to ensure adequate vitamin D intake.    Medication Management.  A prescription for calcitriol will be sent to his pharmacy.           Return if symptoms worsen or fail to improve.      This note was created using voice recognition software (Dragon). The accuracy of the dictation is limited by the abilities of the software. I have reviewed the note prior to signing, however some errors in grammar and context are still possible. If you have any questions related to this note please do not hesitate to contact our office.    "

## 2024-09-16 NOTE — TELEPHONE ENCOUNTER
"Spoke with patient at request of provider following pt booking an appointment out of concern for \"possible stroke\" as reported to MA. MA had advised pt to go to the ER for stroke-like symptoms & pt had declined ER.     Patient reports that 2 nights ago he experienced what he describes as a \"fade out\" where he was alert but unable to move his body. When this occurred, he was in hot tub and his wife had to drag him out and considered calling 911 but the episode resolved after being removed from the hot tub. Pt reports he had been drinking a fair amount of alcohol prior to this event and he had also taken his sildenafil.     Pt reports he has been drinking adequate water and that he does not presently have any symptoms of concern to him but wanted to investigate why this episode occurred.     Pt denies loss of consciousness, facial droop, difficulty with breathing, heart palpitations, nausea/vomiting/diarrhea, fevers, confusion, difficulty breathing, recent head injury, or bleeding.     Per protocol, recommendation is for patient to be seen in office today. His appointment is planned for 2 pm, and patient was en route to this appointment during our call. Pt plans to follow recommendation and attend his planned appointment.    Reason for Disposition   Patient wants to be seen    Additional Information   Negative: SEVERE difficulty breathing (e.g., struggling for each breath, speaks in single words)   Negative: Shock suspected (e.g., cold/pale/clammy skin, too weak to stand, low BP, rapid pulse)   Negative: Difficult to awaken or acting confused (e.g., disoriented, slurred speech)   Negative: Fainted > 15 minutes ago and still feels too weak or dizzy to stand   Negative: SEVERE weakness (i.e., unable to walk or barely able to walk, requires support) and new onset or worsening   Negative: Sounds like a life-threatening emergency to the triager   Recent heat exposure, suspected cause of weakness   Negative: Fever > 103 F " (39.4 C)   Negative: Unconscious or difficult to awaken   Negative: Acting confused (e.g., disoriented, slurred speech)   Negative: Seizure has occurred   Negative: Very weak (can't stand)   Negative: Has fainted (passed out)   Negative: Sounds like a life-threatening emergency to the triager   Negative: Swelling of both ankles (i.e., pedal edema) and caused by hot weather   Negative: Unable to walk or can only walk with assistance (e.g., requires support)   Negative: Fever > 103 F (39.4 C)   Negative: Vomiting interferes with drinking fluids   Negative: Dizziness, fever, or muscle cramps persist after 2 hours of oral fluids and rest   Negative: Patient sounds very sick or weak to the triager   Negative: Fever > 101 F (38.3 C) and over 60 years of age   Negative: Fever > 100.0 F (37.8 C) and has diabetes mellitus or a weak immune system (e.g., HIV positive, cancer chemotherapy, organ transplant, splenectomy, chronic steroids)   Negative: Fever > 100.0 F (37.8 C) and bedridden (e.g., nursing home patient, stroke, chronic illness, recovering from surgery)    Protocols used: Weakness (Generalized) and Fatigue-A-OH, Heat Exposure (Heat Exhaustion and Heat Stroke)-A-OH

## 2024-09-16 NOTE — TELEPHONE ENCOUNTER
Please send high priority to nurse pool, they should go to the ER based on complaint in the notes.  Thank you

## 2024-12-09 DIAGNOSIS — N52.8 OTHER MALE ERECTILE DYSFUNCTION: ICD-10-CM

## 2024-12-10 RX ORDER — SILDENAFIL 100 MG/1
100 TABLET, FILM COATED ORAL
Qty: 20 TABLET | Refills: 5 | Status: SHIPPED | OUTPATIENT
Start: 2024-12-10

## 2025-04-11 DIAGNOSIS — N52.8 OTHER MALE ERECTILE DYSFUNCTION: ICD-10-CM

## 2025-04-14 RX ORDER — SILDENAFIL 100 MG/1
100 TABLET, FILM COATED ORAL
Qty: 20 TABLET | Refills: 5 | Status: SHIPPED | OUTPATIENT
Start: 2025-04-14

## (undated) DEVICE — SUCTION INSTRUMENT YANKAUER BULBOUS TIP W/O VENT (50EA/CA)

## (undated) DEVICE — OBTURATOR 8MM BLADELESS - (24EA/BX) DA VINCI

## (undated) DEVICE — WATER IRRIG. STER. 1500 ML - (9/CA)

## (undated) DEVICE — SCISSOR MONOPLR CRV(HOT SHEAR - DA VINCI 10X'S REUSABLE

## (undated) DEVICE — CANISTER SUCTION 3000ML MECHANICAL FILTER AUTO SHUTOFF MEDI-VAC NONSTERILE LF DISP  (40EA/CA)

## (undated) DEVICE — KIT ANESTHESIA W/CIRCUIT & 3/LT BAG W/FILTER (20EA/CA)

## (undated) DEVICE — SENSOR SPO2 NEO LNCS ADHESIVE (20/BX) SEE USER NOTES

## (undated) DEVICE — SODIUM CHL IRRIGATION 0.9% 1000ML (12EA/CA)

## (undated) DEVICE — NEEDLE DRIVER SUTURE CUT - DA VINCI 10X'S REUSABLE

## (undated) DEVICE — DRESSING TRANSPARENT FILM TEGADERM 4 X 4.75" (50EA/BX)"

## (undated) DEVICE — GLOVE BIOGEL PI INDICATOR SZ 6.5 SURGICAL PF LF - (50/BX 4BX/CA)

## (undated) DEVICE — HEAD HOLDER JUNIOR/ADULT

## (undated) DEVICE — PROTECTOR ULNA NERVE - (36PR/CA)

## (undated) DEVICE — Device

## (undated) DEVICE — TUBING CLEARLINK DUO-VENT - C-FLO (48EA/CA)

## (undated) DEVICE — SYSTEM CLEARIFY VISUALIZATION (10EA/PK)

## (undated) DEVICE — SYRINGE DISP. 12 CC LL - (100/BX)

## (undated) DEVICE — SUTURE 0 VICRYL PLUS CT-2 - 27 INCH (36/BX)

## (undated) DEVICE — FORCEP BIPOLAR FENESTRATED - DA VINCI 10X'S REUSABLE

## (undated) DEVICE — MASK ANESTHESIA ADULT  - (100/CA)

## (undated) DEVICE — ELECTRODE DUAL RETURN W/ CORD - (50/PK)

## (undated) DEVICE — NEEDLE INSFL 120MM 14GA VRRS - (20/BX)

## (undated) DEVICE — SLEEVE, VASO, THIGH, MED

## (undated) DEVICE — COVER TIP ENDOWRIST HOT SHEAR - (10EA/BX) DA VINCI

## (undated) DEVICE — CHLORAPREP 26 ML APPLICATOR - ORANGE TINT(25/CA)

## (undated) DEVICE — ROBOTIC SURGERY SERVICES

## (undated) DEVICE — TROCAR 12 X 150 KII FIOS Z THREAD (6EA/BX)

## (undated) DEVICE — SUTURE 4-0 MONOCRYL PLUS PS-2 - 27 INCH (36/BX)

## (undated) DEVICE — SUTURE 2-0 30CM STRATAFIX SPIRAL PDO ***WAS PART #SXPD1B401 *****

## (undated) DEVICE — TUBE E-T HI-LO CUFF 8.0MM (10EA/PK)

## (undated) DEVICE — SET LEADWIRE 5 LEAD BEDSIDE DISPOSABLE ECG (1SET OF 5/EA)

## (undated) DEVICE — ELECTRODE 850 FOAM ADHESIVE - HYDROGEL RADIOTRNSPRNT (50/PK)

## (undated) DEVICE — SUTURE GENERAL

## (undated) DEVICE — GLOVE BIOGEL INDICATOR SZ 8.5 SURGICAL PF LTX - (50/BX 4BX/CA)

## (undated) DEVICE — DERMABOND ADVANCED - (12EA/BX)

## (undated) DEVICE — TUBE NG SALEM SUMP 16FR (50EA/CA)

## (undated) DEVICE — TUBE CONNECT SUCTION CLEAR 120 X 1/4" (50EA/CA)"

## (undated) DEVICE — KIT ROOM DECONTAMINATION

## (undated) DEVICE — GLOVE BIOGEL PI ORTHO SZ 8 PF LF (40PR/BX)

## (undated) DEVICE — SET EXTENSION WITH 2 PORTS (48EA/CA) ***PART #2C8610 IS A SUBSTITUTE*****

## (undated) DEVICE — LACTATED RINGERS INJ 1000 ML - (14EA/CA 60CA/PF)

## (undated) DEVICE — GLOVE BIOGEL PI INDICATOR SZ 7.0 SURGICAL PF LF - (50/BX 4BX/CA)

## (undated) DEVICE — DRAPE 3 ARM DA VANCI SI - (5EA/CA)

## (undated) DEVICE — GOWN WARMING STANDARD FLEX - (30/CA)

## (undated) DEVICE — GLOVE BIOGEL SZ 8 SURGICAL PF LTX - (50PR/BX 4BX/CA)

## (undated) DEVICE — GOWN SURGEONS X-LARGE - DISP. (30/CA)

## (undated) DEVICE — GLOVE SZ 6.5 BIOGEL PI MICRO - PF LF (50PR/BX)